# Patient Record
Sex: FEMALE | Race: WHITE | NOT HISPANIC OR LATINO | URBAN - METROPOLITAN AREA
[De-identification: names, ages, dates, MRNs, and addresses within clinical notes are randomized per-mention and may not be internally consistent; named-entity substitution may affect disease eponyms.]

---

## 2017-01-03 ENCOUNTER — APPOINTMENT (RX ONLY)
Dept: URBAN - METROPOLITAN AREA CLINIC 349 | Facility: CLINIC | Age: 42
Setting detail: DERMATOLOGY
End: 2017-01-03

## 2017-01-03 DIAGNOSIS — Z85.828 PERSONAL HISTORY OF OTHER MALIGNANT NEOPLASM OF SKIN: ICD-10-CM

## 2017-01-03 DIAGNOSIS — Z71.89 OTHER SPECIFIED COUNSELING: ICD-10-CM

## 2017-01-03 DIAGNOSIS — L30.9 DERMATITIS, UNSPECIFIED: ICD-10-CM

## 2017-01-03 DIAGNOSIS — L82.1 OTHER SEBORRHEIC KERATOSIS: ICD-10-CM

## 2017-01-03 DIAGNOSIS — L73.8 OTHER SPECIFIED FOLLICULAR DISORDERS: ICD-10-CM

## 2017-01-03 DIAGNOSIS — L81.4 OTHER MELANIN HYPERPIGMENTATION: ICD-10-CM

## 2017-01-03 PROBLEM — L70.0 ACNE VULGARIS: Status: ACTIVE | Noted: 2017-01-03

## 2017-01-03 PROBLEM — L57.8 OTHER SKIN CHANGES DUE TO CHRONIC EXPOSURE TO NONIONIZING RADIATION: Status: ACTIVE | Noted: 2017-01-03

## 2017-01-03 PROCEDURE — ? OBSERVATION

## 2017-01-03 PROCEDURE — 99202 OFFICE O/P NEW SF 15 MIN: CPT

## 2017-01-03 PROCEDURE — ? COUNSELING

## 2017-01-03 PROCEDURE — ? OTHER

## 2017-01-03 PROCEDURE — ? RECOMMENDATIONS

## 2017-01-03 ASSESSMENT — LOCATION ZONE DERM
LOCATION ZONE: NOSE
LOCATION ZONE: LEG
LOCATION ZONE: FACE

## 2017-01-03 ASSESSMENT — LOCATION DETAILED DESCRIPTION DERM
LOCATION DETAILED: LEFT CENTRAL ZYGOMA
LOCATION DETAILED: LEFT NASAL ALA
LOCATION DETAILED: LEFT CENTRAL MALAR CHEEK
LOCATION DETAILED: LEFT INFERIOR CENTRAL MALAR CHEEK
LOCATION DETAILED: LEFT ANTERIOR PROXIMAL THIGH

## 2017-01-03 ASSESSMENT — LOCATION SIMPLE DESCRIPTION DERM
LOCATION SIMPLE: LEFT NOSE
LOCATION SIMPLE: LEFT ZYGOMA
LOCATION SIMPLE: LEFT CHEEK
LOCATION SIMPLE: LEFT THIGH

## 2017-01-03 NOTE — PROCEDURE: RECOMMENDATIONS
Recommendations (Free Text): Bleaching creams can be used as well as RetinA\\nDaily sunscreen should be worn to prevent further sun damage
Detail Level: Detailed
Recommendations (Free Text): Samples of ultravate given to use on area to help fade
Recommendations (Free Text): Panoxyl wash otc 4% for oily face

## 2017-01-03 NOTE — HPI: NON-MELANOMA SKIN CANCER F/U (HISTORY OF NMSC)
How Many Skin Cancers Have You Had?: one
What Is The Reason For Today's Visit?: History of Non-Melanoma Skin Cancer
When Was Your Last Cancer Diagnosed?: 2014

## 2017-01-03 NOTE — PROCEDURE: OTHER
Other (Free Text): History provided per patient
Detail Level: Detailed
Note Text (......Xxx Chief Complaint.): This diagnosis correlates with the

## 2017-02-27 ENCOUNTER — HOSPITAL ENCOUNTER (OUTPATIENT)
Dept: SURGERY | Age: 42
Discharge: HOME | End: 2017-02-27

## 2017-02-28 VITALS — BODY MASS INDEX: 24.16 KG/M2 | WEIGHT: 145 LBS | HEIGHT: 65 IN

## 2017-02-28 NOTE — PERIOP NOTES
Patient verified name, , and surgery as listed in [facility]. Type 2 surgery    Labs per surgeon: UNKNOWN; orders NOT received. Labs per anesthesia protocol: HGB  EKG: NOT needed per protocols. Patient informed of GYN class on 3/3/17 @ 1100 at which time labs will be drawn. Patient will also receive all patient education and hibiclens soap to use per hospital policy. Patient instructed to hold all vitamins 7 days prior to surgery and NSAIDS 5 days prior to surgery, patient verbalized understanding. Patient instructed to continue previous medications as prescribed prior to surgery and to take the following medications the day of surgery according to anesthesia guidelines with a small sip of water: - none. Patient answered medical/surgical history questions at their best of ability. All prior to admission medications documented in [facility].

## 2017-03-03 ENCOUNTER — HOSPITAL ENCOUNTER (OUTPATIENT)
Dept: SURGERY | Age: 42
Discharge: HOME | End: 2017-03-03
Payer: COMMERCIAL

## 2017-03-03 LAB — HGB BLD-MCNC: 11.4 G/DL (ref 11.7–15.4)

## 2017-03-03 PROCEDURE — 36415 COLL VENOUS BLD VENIPUNCTURE: CPT | Performed by: ANESTHESIOLOGY

## 2017-03-03 PROCEDURE — 85018 HEMOGLOBIN: CPT | Performed by: ANESTHESIOLOGY

## 2017-03-03 NOTE — PERIOP NOTES
Lab results within limits per anesthesia protocol; OK for surgery.      Recent Results (from the past 12 hour(s))   HEMOGLOBIN    Collection Time: 03/03/17 11:10 AM   Result Value Ref Range    HGB 11.4 (L) 11.7 - 15.4 g/dL

## 2017-03-12 ENCOUNTER — ANESTHESIA EVENT (OUTPATIENT)
Dept: SURGERY | Age: 42
End: 2017-03-12
Payer: COMMERCIAL

## 2017-03-13 ENCOUNTER — ANESTHESIA (OUTPATIENT)
Dept: SURGERY | Age: 42
End: 2017-03-13
Payer: COMMERCIAL

## 2017-03-13 ENCOUNTER — HOSPITAL ENCOUNTER (OUTPATIENT)
Age: 42
Setting detail: OUTPATIENT SURGERY
Discharge: HOME | End: 2017-03-13
Attending: OBSTETRICS & GYNECOLOGY | Admitting: OBSTETRICS & GYNECOLOGY
Payer: COMMERCIAL

## 2017-03-13 VITALS
OXYGEN SATURATION: 99 % | DIASTOLIC BLOOD PRESSURE: 57 MMHG | RESPIRATION RATE: 16 BRPM | SYSTOLIC BLOOD PRESSURE: 97 MMHG | HEIGHT: 65 IN | BODY MASS INDEX: 25.52 KG/M2 | WEIGHT: 153.19 LBS | TEMPERATURE: 100.3 F | HEART RATE: 57 BPM

## 2017-03-13 LAB
ABO + RH BLD: NORMAL
BLOOD GROUP ANTIBODIES SERPL: NORMAL
ERYTHROCYTE [DISTWIDTH] IN BLOOD BY AUTOMATED COUNT: 13.7 % (ref 11.9–14.6)
HCG UR QL: NEGATIVE
HCT VFR BLD AUTO: 36.9 % (ref 35.8–46.3)
HGB BLD-MCNC: 11.7 G/DL (ref 11.7–15.4)
MCH RBC QN AUTO: 29.2 PG (ref 26.1–32.9)
MCHC RBC AUTO-ENTMCNC: 31.7 G/DL (ref 31.4–35)
MCV RBC AUTO: 92 FL (ref 79.6–97.8)
PLATELET # BLD AUTO: 334 K/UL (ref 150–450)
PMV BLD AUTO: 10.5 FL (ref 10.8–14.1)
RBC # BLD AUTO: 4.01 M/UL (ref 4.05–5.25)
SPECIMEN EXP DATE BLD: NORMAL
WBC # BLD AUTO: 4 K/UL (ref 4.3–11.1)

## 2017-03-13 PROCEDURE — 77030008477 HC STYL SATN SLP COVD -A: Performed by: ANESTHESIOLOGY

## 2017-03-13 PROCEDURE — 74011250636 HC RX REV CODE- 250/636: Performed by: ANESTHESIOLOGY

## 2017-03-13 PROCEDURE — C1782 MORCELLATOR: HCPCS | Performed by: OBSTETRICS & GYNECOLOGY

## 2017-03-13 PROCEDURE — 74011250636 HC RX REV CODE- 250/636: Performed by: OBSTETRICS & GYNECOLOGY

## 2017-03-13 PROCEDURE — 77030031139 HC SUT VCRL2 J&J -A: Performed by: OBSTETRICS & GYNECOLOGY

## 2017-03-13 PROCEDURE — 77030035277 HC OBTRTR BLDELSS DISP INTU -B: Performed by: OBSTETRICS & GYNECOLOGY

## 2017-03-13 PROCEDURE — 77030014064 HC TIP MANIP UTER COOP -C: Performed by: OBSTETRICS & GYNECOLOGY

## 2017-03-13 PROCEDURE — 77030008606 HC TRCR ENDOSC KII AMR -B: Performed by: OBSTETRICS & GYNECOLOGY

## 2017-03-13 PROCEDURE — 85027 COMPLETE CBC AUTOMATED: CPT | Performed by: OBSTETRICS & GYNECOLOGY

## 2017-03-13 PROCEDURE — 77030008771 HC TU NG SALEM SUMP -A: Performed by: ANESTHESIOLOGY

## 2017-03-13 PROCEDURE — 77030022704 HC SUT VLOC COVD -B: Performed by: OBSTETRICS & GYNECOLOGY

## 2017-03-13 PROCEDURE — 77030008703 HC TU ET UNCUF COVD -A: Performed by: ANESTHESIOLOGY

## 2017-03-13 PROCEDURE — 77030016151 HC PROTCTR LNS DFOG COVD -B: Performed by: OBSTETRICS & GYNECOLOGY

## 2017-03-13 PROCEDURE — 77030005520 HC CATH URETH FOL38 BARD -A: Performed by: OBSTETRICS & GYNECOLOGY

## 2017-03-13 PROCEDURE — 74011250636 HC RX REV CODE- 250/636

## 2017-03-13 PROCEDURE — 76010000878 HC OR TIME 3 TO 3.5HR INTENSV - TIER 2: Performed by: OBSTETRICS & GYNECOLOGY

## 2017-03-13 PROCEDURE — 76210000016 HC OR PH I REC 1 TO 1.5 HR: Performed by: OBSTETRICS & GYNECOLOGY

## 2017-03-13 PROCEDURE — 77030007956 HC PCH ENDOSC SPEC COVD -C: Performed by: OBSTETRICS & GYNECOLOGY

## 2017-03-13 PROCEDURE — 77030010507 HC ADH SKN DERMBND J&J -B: Performed by: OBSTETRICS & GYNECOLOGY

## 2017-03-13 PROCEDURE — 74011000250 HC RX REV CODE- 250: Performed by: ANESTHESIOLOGY

## 2017-03-13 PROCEDURE — 77030032490 HC SLV COMPR SCD KNE COVD -B: Performed by: OBSTETRICS & GYNECOLOGY

## 2017-03-13 PROCEDURE — 77030020782 HC GWN BAIR PAWS FLX 3M -B: Performed by: ANESTHESIOLOGY

## 2017-03-13 PROCEDURE — 74011250637 HC RX REV CODE- 250/637: Performed by: ANESTHESIOLOGY

## 2017-03-13 PROCEDURE — 77030002933 HC SUT MCRYL J&J -A: Performed by: OBSTETRICS & GYNECOLOGY

## 2017-03-13 PROCEDURE — 74011250637 HC RX REV CODE- 250/637

## 2017-03-13 PROCEDURE — 88307 TISSUE EXAM BY PATHOLOGIST: CPT | Performed by: OBSTETRICS & GYNECOLOGY

## 2017-03-13 PROCEDURE — 77030000038 HC TIP SCIS LAPSCP SURI -B: Performed by: OBSTETRICS & GYNECOLOGY

## 2017-03-13 PROCEDURE — 77030011640 HC PAD GRND REM COVD -A: Performed by: OBSTETRICS & GYNECOLOGY

## 2017-03-13 PROCEDURE — 86900 BLOOD TYPING SEROLOGIC ABO: CPT | Performed by: OBSTETRICS & GYNECOLOGY

## 2017-03-13 PROCEDURE — 74011000250 HC RX REV CODE- 250

## 2017-03-13 PROCEDURE — 81025 URINE PREGNANCY TEST: CPT

## 2017-03-13 PROCEDURE — 76210000024 HC REC RM PH II 2.5 TO 3 HR: Performed by: OBSTETRICS & GYNECOLOGY

## 2017-03-13 PROCEDURE — 77030010545: Performed by: OBSTETRICS & GYNECOLOGY

## 2017-03-13 PROCEDURE — 74011000250 HC RX REV CODE- 250: Performed by: OBSTETRICS & GYNECOLOGY

## 2017-03-13 PROCEDURE — 77030018846 HC SOL IRR STRL H20 ICUM -A: Performed by: OBSTETRICS & GYNECOLOGY

## 2017-03-13 PROCEDURE — 77030018836 HC SOL IRR NACL ICUM -A: Performed by: OBSTETRICS & GYNECOLOGY

## 2017-03-13 PROCEDURE — 76060000037 HC ANESTHESIA 3 TO 3.5 HR: Performed by: OBSTETRICS & GYNECOLOGY

## 2017-03-13 PROCEDURE — 77030008518 HC TBNG INSUF ENDO STRY -B: Performed by: OBSTETRICS & GYNECOLOGY

## 2017-03-13 PROCEDURE — 77030035044 HC TRCR ENDOSC VRSPRT BLDLSS COVD -C: Performed by: OBSTETRICS & GYNECOLOGY

## 2017-03-13 PROCEDURE — 77030035029 HC NDL INSUF VERES DISP COVD -B: Performed by: OBSTETRICS & GYNECOLOGY

## 2017-03-13 PROCEDURE — 77030008756 HC TU IRR SUC STRY -B: Performed by: OBSTETRICS & GYNECOLOGY

## 2017-03-13 RX ORDER — NALOXONE HYDROCHLORIDE 0.4 MG/ML
0.1 INJECTION, SOLUTION INTRAMUSCULAR; INTRAVENOUS; SUBCUTANEOUS
Status: DISCONTINUED | OUTPATIENT
Start: 2017-03-13 | End: 2017-03-13 | Stop reason: HOSPADM

## 2017-03-13 RX ORDER — PROPOFOL 10 MG/ML
INJECTION, EMULSION INTRAVENOUS AS NEEDED
Status: DISCONTINUED | OUTPATIENT
Start: 2017-03-13 | End: 2017-03-13 | Stop reason: HOSPADM

## 2017-03-13 RX ORDER — NALBUPHINE HYDROCHLORIDE 10 MG/ML
5 INJECTION, SOLUTION INTRAMUSCULAR; INTRAVENOUS; SUBCUTANEOUS
Status: DISCONTINUED | OUTPATIENT
Start: 2017-03-13 | End: 2017-03-13 | Stop reason: HOSPADM

## 2017-03-13 RX ORDER — SODIUM CHLORIDE 0.9 % (FLUSH) 0.9 %
5-10 SYRINGE (ML) INJECTION EVERY 8 HOURS
Status: DISCONTINUED | OUTPATIENT
Start: 2017-03-13 | End: 2017-03-13 | Stop reason: HOSPADM

## 2017-03-13 RX ORDER — HYDROMORPHONE HYDROCHLORIDE 2 MG/ML
0.5 INJECTION, SOLUTION INTRAMUSCULAR; INTRAVENOUS; SUBCUTANEOUS
Status: DISCONTINUED | OUTPATIENT
Start: 2017-03-13 | End: 2017-03-13 | Stop reason: HOSPADM

## 2017-03-13 RX ORDER — FENTANYL CITRATE 50 UG/ML
INJECTION, SOLUTION INTRAMUSCULAR; INTRAVENOUS AS NEEDED
Status: DISCONTINUED | OUTPATIENT
Start: 2017-03-13 | End: 2017-03-13 | Stop reason: HOSPADM

## 2017-03-13 RX ORDER — SODIUM CHLORIDE 0.9 % (FLUSH) 0.9 %
5-10 SYRINGE (ML) INJECTION AS NEEDED
Status: DISCONTINUED | OUTPATIENT
Start: 2017-03-13 | End: 2017-03-13 | Stop reason: HOSPADM

## 2017-03-13 RX ORDER — KETOROLAC TROMETHAMINE 30 MG/ML
INJECTION, SOLUTION INTRAMUSCULAR; INTRAVENOUS AS NEEDED
Status: DISCONTINUED | OUTPATIENT
Start: 2017-03-13 | End: 2017-03-13 | Stop reason: HOSPADM

## 2017-03-13 RX ORDER — DEXAMETHASONE SODIUM PHOSPHATE 4 MG/ML
INJECTION, SOLUTION INTRA-ARTICULAR; INTRALESIONAL; INTRAMUSCULAR; INTRAVENOUS; SOFT TISSUE AS NEEDED
Status: DISCONTINUED | OUTPATIENT
Start: 2017-03-13 | End: 2017-03-13 | Stop reason: HOSPADM

## 2017-03-13 RX ORDER — GLYCOPYRROLATE 0.2 MG/ML
INJECTION INTRAMUSCULAR; INTRAVENOUS AS NEEDED
Status: DISCONTINUED | OUTPATIENT
Start: 2017-03-13 | End: 2017-03-13 | Stop reason: HOSPADM

## 2017-03-13 RX ORDER — SODIUM CHLORIDE, SODIUM LACTATE, POTASSIUM CHLORIDE, CALCIUM CHLORIDE 600; 310; 30; 20 MG/100ML; MG/100ML; MG/100ML; MG/100ML
100 INJECTION, SOLUTION INTRAVENOUS CONTINUOUS
Status: DISCONTINUED | OUTPATIENT
Start: 2017-03-13 | End: 2017-03-13 | Stop reason: HOSPADM

## 2017-03-13 RX ORDER — LIDOCAINE HYDROCHLORIDE 20 MG/ML
INJECTION, SOLUTION EPIDURAL; INFILTRATION; INTRACAUDAL; PERINEURAL AS NEEDED
Status: DISCONTINUED | OUTPATIENT
Start: 2017-03-13 | End: 2017-03-13 | Stop reason: HOSPADM

## 2017-03-13 RX ORDER — NEOSTIGMINE METHYLSULFATE 1 MG/ML
INJECTION INTRAVENOUS AS NEEDED
Status: DISCONTINUED | OUTPATIENT
Start: 2017-03-13 | End: 2017-03-13 | Stop reason: HOSPADM

## 2017-03-13 RX ORDER — FENTANYL CITRATE 50 UG/ML
100 INJECTION, SOLUTION INTRAMUSCULAR; INTRAVENOUS ONCE
Status: DISCONTINUED | OUTPATIENT
Start: 2017-03-13 | End: 2017-03-13 | Stop reason: HOSPADM

## 2017-03-13 RX ORDER — BUPIVACAINE HYDROCHLORIDE AND EPINEPHRINE 2.5; 5 MG/ML; UG/ML
INJECTION, SOLUTION EPIDURAL; INFILTRATION; INTRACAUDAL; PERINEURAL AS NEEDED
Status: DISCONTINUED | OUTPATIENT
Start: 2017-03-13 | End: 2017-03-13 | Stop reason: HOSPADM

## 2017-03-13 RX ORDER — MIDAZOLAM HYDROCHLORIDE 1 MG/ML
2 INJECTION, SOLUTION INTRAMUSCULAR; INTRAVENOUS
Status: DISCONTINUED | OUTPATIENT
Start: 2017-03-13 | End: 2017-03-13 | Stop reason: HOSPADM

## 2017-03-13 RX ORDER — OXYCODONE HYDROCHLORIDE 5 MG/1
5 TABLET ORAL
Status: COMPLETED | OUTPATIENT
Start: 2017-03-13 | End: 2017-03-13

## 2017-03-13 RX ORDER — ONDANSETRON 2 MG/ML
4 INJECTION INTRAMUSCULAR; INTRAVENOUS
Status: DISCONTINUED | OUTPATIENT
Start: 2017-03-13 | End: 2017-03-13 | Stop reason: HOSPADM

## 2017-03-13 RX ORDER — ONDANSETRON 2 MG/ML
INJECTION INTRAMUSCULAR; INTRAVENOUS AS NEEDED
Status: DISCONTINUED | OUTPATIENT
Start: 2017-03-13 | End: 2017-03-13 | Stop reason: HOSPADM

## 2017-03-13 RX ORDER — IBUPROFEN 800 MG/1
800 TABLET ORAL
Qty: 60 TAB | Refills: 0 | Status: SHIPPED | OUTPATIENT
Start: 2017-03-13 | End: 2017-03-23

## 2017-03-13 RX ORDER — SCOLOPAMINE TRANSDERMAL SYSTEM 1 MG/1
1.5 PATCH, EXTENDED RELEASE TRANSDERMAL
COMMUNITY
End: 2017-03-13

## 2017-03-13 RX ORDER — MIDAZOLAM HYDROCHLORIDE 1 MG/ML
2 INJECTION, SOLUTION INTRAMUSCULAR; INTRAVENOUS ONCE
Status: COMPLETED | OUTPATIENT
Start: 2017-03-13 | End: 2017-03-13

## 2017-03-13 RX ORDER — FLUMAZENIL 0.1 MG/ML
0.2 INJECTION INTRAVENOUS
Status: DISCONTINUED | OUTPATIENT
Start: 2017-03-13 | End: 2017-03-13 | Stop reason: HOSPADM

## 2017-03-13 RX ORDER — CEFAZOLIN SODIUM IN 0.9 % NACL 2 G/50 ML
2 INTRAVENOUS SOLUTION, PIGGYBACK (ML) INTRAVENOUS ONCE
Status: COMPLETED | OUTPATIENT
Start: 2017-03-13 | End: 2017-03-13

## 2017-03-13 RX ORDER — SODIUM CHLORIDE 0.9 % (FLUSH) 0.9 %
5-10 SYRINGE (ML) INJECTION EVERY 8 HOURS
Status: CANCELLED | OUTPATIENT
Start: 2017-03-13

## 2017-03-13 RX ORDER — ROCURONIUM BROMIDE 10 MG/ML
INJECTION, SOLUTION INTRAVENOUS AS NEEDED
Status: DISCONTINUED | OUTPATIENT
Start: 2017-03-13 | End: 2017-03-13 | Stop reason: HOSPADM

## 2017-03-13 RX ORDER — LIDOCAINE HYDROCHLORIDE 10 MG/ML
0.1 INJECTION INFILTRATION; PERINEURAL AS NEEDED
Status: DISCONTINUED | OUTPATIENT
Start: 2017-03-13 | End: 2017-03-13 | Stop reason: HOSPADM

## 2017-03-13 RX ORDER — OXYCODONE AND ACETAMINOPHEN 5; 325 MG/1; MG/1
2 TABLET ORAL
Qty: 40 TAB | Refills: 0 | Status: SHIPPED | OUTPATIENT
Start: 2017-03-13 | End: 2017-12-29

## 2017-03-13 RX ORDER — SODIUM CHLORIDE 0.9 % (FLUSH) 0.9 %
5-10 SYRINGE (ML) INJECTION AS NEEDED
Status: CANCELLED | OUTPATIENT
Start: 2017-03-13

## 2017-03-13 RX ADMIN — LIDOCAINE HYDROCHLORIDE 0.1 ML: 10 INJECTION, SOLUTION INFILTRATION; PERINEURAL at 07:25

## 2017-03-13 RX ADMIN — OXYCODONE HYDROCHLORIDE 5 MG: 5 TABLET ORAL at 13:48

## 2017-03-13 RX ADMIN — HYDROMORPHONE HYDROCHLORIDE 0.5 MG: 2 INJECTION, SOLUTION INTRAMUSCULAR; INTRAVENOUS; SUBCUTANEOUS at 11:37

## 2017-03-13 RX ADMIN — SODIUM CHLORIDE, SODIUM LACTATE, POTASSIUM CHLORIDE, AND CALCIUM CHLORIDE 100 ML/HR: 600; 310; 30; 20 INJECTION, SOLUTION INTRAVENOUS at 07:24

## 2017-03-13 RX ADMIN — ROCURONIUM BROMIDE 10 MG: 10 INJECTION, SOLUTION INTRAVENOUS at 09:02

## 2017-03-13 RX ADMIN — FENTANYL CITRATE 25 MCG: 50 INJECTION, SOLUTION INTRAMUSCULAR; INTRAVENOUS at 10:24

## 2017-03-13 RX ADMIN — KETOROLAC TROMETHAMINE 30 MG: 30 INJECTION, SOLUTION INTRAMUSCULAR; INTRAVENOUS at 10:34

## 2017-03-13 RX ADMIN — ROCURONIUM BROMIDE 50 MG: 10 INJECTION, SOLUTION INTRAVENOUS at 08:01

## 2017-03-13 RX ADMIN — LIDOCAINE HYDROCHLORIDE 60 MG: 20 INJECTION, SOLUTION EPIDURAL; INFILTRATION; INTRACAUDAL; PERINEURAL at 08:01

## 2017-03-13 RX ADMIN — ONDANSETRON 4 MG: 2 INJECTION INTRAMUSCULAR; INTRAVENOUS at 09:25

## 2017-03-13 RX ADMIN — PROPOFOL 200 MG: 10 INJECTION, EMULSION INTRAVENOUS at 08:01

## 2017-03-13 RX ADMIN — HYDROMORPHONE HYDROCHLORIDE 0.5 MG: 2 INJECTION, SOLUTION INTRAMUSCULAR; INTRAVENOUS; SUBCUTANEOUS at 11:07

## 2017-03-13 RX ADMIN — FENTANYL CITRATE 100 MCG: 50 INJECTION, SOLUTION INTRAMUSCULAR; INTRAVENOUS at 08:01

## 2017-03-13 RX ADMIN — DEXAMETHASONE SODIUM PHOSPHATE 10 MG: 4 INJECTION, SOLUTION INTRA-ARTICULAR; INTRALESIONAL; INTRAMUSCULAR; INTRAVENOUS; SOFT TISSUE at 08:10

## 2017-03-13 RX ADMIN — SODIUM CHLORIDE, SODIUM LACTATE, POTASSIUM CHLORIDE, AND CALCIUM CHLORIDE: 600; 310; 30; 20 INJECTION, SOLUTION INTRAVENOUS at 10:30

## 2017-03-13 RX ADMIN — NEOSTIGMINE METHYLSULFATE 3 MG: 1 INJECTION INTRAVENOUS at 10:37

## 2017-03-13 RX ADMIN — GLYCOPYRROLATE 0.4 MG: 0.2 INJECTION INTRAMUSCULAR; INTRAVENOUS at 10:37

## 2017-03-13 RX ADMIN — CEFAZOLIN 2 G: 1 INJECTION, POWDER, FOR SOLUTION INTRAMUSCULAR; INTRAVENOUS; PARENTERAL at 08:05

## 2017-03-13 RX ADMIN — MIDAZOLAM HYDROCHLORIDE 2 MG: 1 INJECTION, SOLUTION INTRAMUSCULAR; INTRAVENOUS at 07:27

## 2017-03-13 RX ADMIN — SODIUM CHLORIDE, SODIUM LACTATE, POTASSIUM CHLORIDE, AND CALCIUM CHLORIDE: 600; 310; 30; 20 INJECTION, SOLUTION INTRAVENOUS at 08:12

## 2017-03-13 RX ADMIN — FENTANYL CITRATE 100 MCG: 50 INJECTION, SOLUTION INTRAMUSCULAR; INTRAVENOUS at 08:23

## 2017-03-13 NOTE — ANESTHESIA POSTPROCEDURE EVALUATION
Post-Anesthesia Evaluation and Assessment    Patient: Tobias Garcia MRN: [id number]  SSN: [id number]    YOB: 1975  Age: 38 y.o. Sex: female       Cardiovascular Function/Vital Signs  Visit Vitals    BP 97/57    Pulse (!) 57    Temp 37.9 °C (100.3 °F)    Resp 16    Ht 5' 5\" (1.651 m)    Wt 69.5 kg (153 lb 3 oz)    SpO2 99%    BMI 25.49 kg/m2       Patient is status post general anesthesia for Procedure(s): HYSTERECTOMY SUPRACERVICAL ROBOTIC ASSISTED WITH BILATERAL SALPINGECTOMY. Nausea/Vomiting: None    Postoperative hydration reviewed and adequate. Pain:  Pain Scale 1: Numeric (0 - 10) (03/13/17 1145)  Pain Intensity 1: 5 (03/13/17 1145)   Managed    Neurological Status:   Neuro (WDL): Exceptions to WDL (03/13/17 1145)  Neuro  Neurologic State: Drowsy; Eyes open spontaneously (03/13/17 1145)  Orientation Level: Oriented to person;Oriented to place;Oriented to situation (03/13/17 1145)  Speech: Clear (03/13/17 1145)  LUE Motor Response: Purposeful (03/13/17 1145)  LLE Motor Response: Purposeful (03/13/17 1145)  RUE Motor Response: Purposeful (03/13/17 1145)  RLE Motor Response: Purposeful (03/13/17 1145)   At baseline    Mental Status and Level of Consciousness: Arousable    Pulmonary Status:   O2 Device: Room air (03/13/17 1200)   Adequate oxygenation and airway patent    Complications related to anesthesia: None    Post-anesthesia assessment completed.  No concerns    Signed By: Manuel Quiroga MD     March 13, 2017

## 2017-03-13 NOTE — BRIEF OP NOTE
BRIEF OPERATIVE NOTE    Date of Procedure: 3/13/2017   Preoperative Diagnosis: Dysfunctional uterine hemorrhage [N93.8]  Leiomyoma, intramural [D25.1]  Postoperative Diagnosis: Dysfunctional uterine hemorrhage [N93.8]  Leiomyoma, intramural [D25.1]    Procedure(s): HYSTERECTOMY SUPRACERVICAL ROBOTIC ASSISTED WITH BILATERAL SALPINGECTOMY  Surgeon(s) and Role:     * Patricia Keita MD - Primary            Surgical Staff:  Circ-1: Compa Bragg RN  Circ-2: Carlos Giraldo RN  Scrub Tech-2: Major Martina; Birda Lady  Event Time In   Incision Start 0818   Incision Close 1043     Anesthesia: General   Estimated Blood Loss: 200  Specimens:   ID Type Source Tests Collected by Time Destination   1 : uterus and bilateral fallopian tubes Fresh Uterus with Bilateral Fallopian Tubes  Patricia Keita MD 3/13/2017 1016 Pathology      Findings: 18wk Size uterus, with large 13cm posterior fibroid. Cavity depth sounded to 15cm. Normal ovaries, normal tubes, appendix, upper abdomen.    Pelvis otherwise normal.  Complications: none  Implants: * No implants in log *

## 2017-03-13 NOTE — IP AVS SNAPSHOT
Chiqui Patel 
 
 
 [id number] [facility] [place]  
[phone] Patient: Clydia Angelucci MRN: [id number] BVR:[birth year] You are allergic to the following No active allergies Recent Documentation Height Weight BMI OB Status Smoking Status 1.651 m 69.5 kg 25.49 kg/m2 Having regular periods Never Smoker Emergency Contacts Name Discharge Info Relation Home Work Mobile Wong Malcolm  Spouse [phone] About your hospitalization You were admitted on:  March 13, 2017 You last received care in the:  [facility] PACU You were discharged on:  March 13, 2017 Unit phone number:  [phone] Why you were hospitalized Your primary diagnosis was:  Not on File Providers Seen During Your Hospitalizations Provider Role Specialty Primary office phone Patricia Keita MD Attending Provider Obstetrics & Gynecology [phone] Your Primary Care Physician (PCP) Primary Care Physician Office Phone Office Fax [address] [address] [phone] Follow-up Information Follow up With Details Comments Contact Info Makayla Sutton MD   [address] [place] [address] Adult and Family Medicine [facility] [address] 
[phone] Patricia Keita MD Schedule an appointment as soon as possible for a visit in 1 month(s)  [address] [place] [address] [facility] [address] 
[phone] Current Discharge Medication List  
  
START taking these medications Dose & Instructions Dispensing Information Comments Morning Noon Evening Bedtime  
 ibuprofen 800 mg tablet Commonly known as:  MOTRIN Your last dose was: Your next dose is: Other:  _________ Dose:  800 mg Take 1 Tab by mouth every eight (8) hours as needed for Pain for up to 10 days. Quantity:  60 Tab Refills:  0  
     
   
   
   
  
 oxyCODONE-acetaminophen 5-325 mg per tablet Commonly known as:  PERCOCET Your last dose was: Your next dose is: Other:  _________ Dose:  2 Tab Take 2 Tabs by mouth every four (4) hours as needed for Pain. Max Daily Amount: 12 Tabs. Quantity:  40 Tab Refills:  0 STOP taking these medications   
 scopolamine 1.5 mg (1 mg over 3 days) Pt3d Commonly known as:  TRANSDERM-SCOP Where to Get Your Medications Information on where to get these meds will be given to you by the nurse or doctor. ! Ask your nurse or doctor about these medications  
  ibuprofen 800 mg tablet  
 oxyCODONE-acetaminophen 5-325 mg per tablet Discharge Instructions Vaginal Hysterectomy: What to Expect at [facility] Your Recovery You can expect to feel better and stronger each day, although you may need pain medicine for a week or two. You may get tired easily or have less energy than usual. This may last for several weeks after surgery. You will probably notice that your belly is swollen and puffy. This is common. The swelling will take several weeks to go down. It may take about 4 to 6 weeks to fully recover. The recovery time may be less for some patients. You may have some light vaginal bleeding. Don't have sex until the doctor says it is okay. Don't douche or put anything into your vagina, such as a tampon, until your doctor says it is okay. It is important to avoid lifting while you are recovering so that you can heal. 
This care sheet gives you a general idea about how long it will take for you to recover. But each person recovers at a different pace. Follow the steps below to get better as quickly as possible. How can you care for yourself at home? Activity · Rest when you feel tired. Getting enough sleep will help you recover. · Try to walk each day. Start by walking a little more than you did the day before. Bit by bit, increase the amount you walk. Walking boosts blood flow and helps prevent pneumonia and constipation. · Avoid lifting anything that would make you strain. This may include heavy grocery bags and milk containers, a heavy briefcase or backpack, cat litter or dog food bags, a vacuum , or a child. · Avoid strenuous activities, such as biking, jogging, weight lifting, or aerobic exercise, until your doctor says it is okay. · You may shower. Pat the cut (incision) dry. Do not take a bath for the first 2 weeks, or until your doctor tells you it is okay. · Ask your doctor when you can drive again. · You will probably need to take about 2 weeks off from work. It depends on the type of work you do and how you feel. · Your doctor will tell you when you can have sex again. Diet · You can eat your normal diet. If your stomach is upset, try bland, low-fat foods like plain rice, broiled chicken, toast, and yogurt. · Drink plenty of fluids (unless your doctor tells you not to). · You may notice that your bowel movements are not regular right after your surgery. This is common. Try to avoid constipation and straining with bowel movements. You may want to take a fiber supplement every day. If you have not had a bowel movement after a couple of days, ask your doctor about taking a mild laxative. Medicines · Your doctor will tell you if and when you can restart your medicines. He or she will also give you instructions about taking any new medicines. · If you take blood thinners, such as warfarin (Coumadin), clopidogrel (Plavix), or aspirin, be sure to talk to your doctor. He or she will tell you if and when to start taking those medicines again. Make sure that you understand exactly what your doctor wants you to do. · Be safe with medicines. Take pain medicines exactly as directed. ¨ If the doctor gave you a prescription medicine for pain, take it as prescribed. ¨ If you are not taking a prescription pain medicine, ask your doctor if you can take an over-the-counter medicine. · If you think your pain medicine is making you sick to your stomach: 
¨ Take your medicine after meals (unless your doctor has told you not to). ¨ Ask your doctor for a different pain medicine. · If your doctor prescribed antibiotics, take them as directed. Do not stop taking them just because you feel better. You need to take the full course of antibiotics. Incision care · You may have stitches over the cuts (incisions) the doctor made in your belly. If you have strips of tape on the incisions the doctor made, leave the tape on for a week or until it falls off. Or follow your doctor's instructions for removing the tape. · Wash the area daily with warm, soapy water, and pat it dry. Don't use hydrogen peroxide or alcohol, which can slow healing. You may cover the area with a gauze bandage if it weeps or rubs against clothing. Change the bandage every day. · Keep the area clean and dry. Other instructions · You may have some light vaginal bleeding. Wear sanitary pads if needed. Do not douche or use tampons. Follow-up care is a key part of your treatment and safety. Be sure to make and go to all appointments, and call your doctor if you are having problems. It's also a good idea to know your test results and keep a list of the medicines you take. When should you call for help? Call 911 anytime you think you may need emergency care. For example, call if: 
· You passed out (lost consciousness). · You have severe trouble breathing. · You have sudden chest pain and shortness of breath, or you cough up blood. Call your doctor now or seek immediate medical care if: 
· You have bright red vaginal bleeding that soaks one or more pads in an hour, or you have large clots. · You have foul-smelling discharge from your vagina. · You are sick to your stomach or cannot keep fluids down. · You have pain that does not get better after you take pain medicine. · You have loose stitches, or your incision comes open. · You have signs of infection, such as: 
¨ Increased pain, swelling, warmth, or redness. ¨ Red streaks leading from the incision. ¨ Pus draining from the incision. ¨ A fever. · You have signs of a blood clot, such as: 
¨ Pain in your calf, back of the knee, thigh, or groin. ¨ Redness and swelling in your leg or groin. · You have trouble passing urine or stool, especially if you have pain or swelling in your lower belly. Watch closely for changes in your health, and be sure to contact your doctor if: 
· You do not have a bowel movement after taking a laxative. · You have hot flashes, sweating, flushing, or a fast heartbeat, but no fever. Where can you learn more? Go to http://bran-araceli.info/. Enter L197 in the search box to learn more about \"Laparoscopically Assisted Vaginal Hysterectomy: What to Expect at Home. \" Current as of: February 25, 2016 Content Version: 11.1 © 0918-3716 [employer]. Care instructions adapted under license by [employer] (which disclaims liability or warranty for this information). If you have questions about a medical condition or this instruction, always ask your healthcare professional. [place] [address] any warranty or liability for your use of this information. Discharge Orders None Introducing [facility]! Dear Rolanda Cohn: Thank you for requesting a Equiom account. Our records indicate that you already have an active Equiom account. You can access your account anytime at [email or web address] Did you know that you can access your hospital and ER discharge instructions at any time in Equiom?   You can also review all of your test results from your hospital stay or ER visit. Additional Information If you have questions, please visit the Frequently Asked Questions section of the [facility] website at [email or web address] Remember, MyChart is NOT to be used for urgent needs. For medical emergencies, dial 911. Now available from your iPhone and Android! General Information Please provide this summary of care documentation to your next provider. Patient Signature:  ____________________________________________________________ Date:  ____________________________________________________________  
  
Harper Ewingm Provider Signature:  ____________________________________________________________ Date:  ____________________________________________________________

## 2017-03-13 NOTE — ANESTHESIA PREPROCEDURE EVALUATION
Anesthetic History     PONV          Review of Systems / Medical History  Patient summary reviewed and pertinent labs reviewed    Pulmonary  Within defined limits                 Neuro/Psych   Within defined limits           Cardiovascular  Within defined limits                Exercise tolerance: >4 METS     GI/Hepatic/Renal  Within defined limits              Endo/Other  Within defined limits           Other Findings              Physical Exam    Airway  Mallampati: I  TM Distance: > 6 cm  Neck ROM: normal range of motion   Mouth opening: Normal     Cardiovascular  Regular rate and rhythm,  S1 and S2 normal,  no murmur, click, rub, or gallop  Rhythm: regular  Rate: normal         Dental  No notable dental hx       Pulmonary  Breath sounds clear to auscultation               Abdominal  GI exam deferred       Other Findings            Anesthetic Plan    ASA: 1  Anesthesia type: general          Induction: Intravenous  Anesthetic plan and risks discussed with: Patient and Spouse      Pt has scopolamine patch that was ordered by surgeon already applied behind right ear. Will plan for intraop anti-emetics.

## 2017-03-13 NOTE — DISCHARGE INSTRUCTIONS
Vaginal Hysterectomy: What to Expect at [address] [place] [address] can expect to feel better and stronger each day, although you may need pain medicine for a week or two. You may get tired easily or have less energy than usual. This may last for several weeks after surgery. You will probably notice that your belly is swollen and puffy. This is common. The swelling will take several weeks to go down. It may take about 4 to 6 weeks to fully recover. The recovery time may be less for some patients. You may have some light vaginal bleeding. Don't have sex until the doctor says it is okay. Don't douche or put anything into your vagina, such as a tampon, until your doctor says it is okay. It is important to avoid lifting while you are recovering so that you can heal.  This care sheet gives you a general idea about how long it will take for you to recover. But each person recovers at a different pace. Follow the steps below to get better as quickly as possible. How can you care for yourself at home? Activity  · Rest when you feel tired. Getting enough sleep will help you recover. · Try to walk each day. Start by walking a little more than you did the day before. Bit by bit, increase the amount you walk. Walking boosts blood flow and helps prevent pneumonia and constipation. · Avoid lifting anything that would make you strain. This may include heavy grocery bags and milk containers, a heavy briefcase or backpack, cat litter or dog food bags, a vacuum , or a child. · Avoid strenuous activities, such as biking, jogging, weight lifting, or aerobic exercise, until your doctor says it is okay. · You may shower. Pat the cut (incision) dry. Do not take a bath for the first 2 weeks, or until your doctor tells you it is okay. · Ask your doctor when you can drive again. · You will probably need to take about 2 weeks off from work. It depends on the type of work you do and how you feel.   · Your doctor will tell you when you can have sex again. Diet  · You can eat your normal diet. If your stomach is upset, try bland, low-fat foods like plain rice, broiled chicken, toast, and yogurt. · Drink plenty of fluids (unless your doctor tells you not to). · You may notice that your bowel movements are not regular right after your surgery. This is common. Try to avoid constipation and straining with bowel movements. You may want to take a fiber supplement every day. If you have not had a bowel movement after a couple of days, ask your doctor about taking a mild laxative. Medicines  · Your doctor will tell you if and when you can restart your medicines. He or she will also give you instructions about taking any new medicines. · If you take blood thinners, such as warfarin (Coumadin), clopidogrel (Plavix), or aspirin, be sure to talk to your doctor. He or she will tell you if and when to start taking those medicines again. Make sure that you understand exactly what your doctor wants you to do. · Be safe with medicines. Take pain medicines exactly as directed. ¨ If the doctor gave you a prescription medicine for pain, take it as prescribed. ¨ If you are not taking a prescription pain medicine, ask your doctor if you can take an over-the-counter medicine. · If you think your pain medicine is making you sick to your stomach:  ¨ Take your medicine after meals (unless your doctor has told you not to). ¨ Ask your doctor for a different pain medicine. · If your doctor prescribed antibiotics, take them as directed. Do not stop taking them just because you feel better. You need to take the full course of antibiotics. Incision care  · You may have stitches over the cuts (incisions) the doctor made in your belly. If you have strips of tape on the incisions the doctor made, leave the tape on for a week or until it falls off. Or follow your doctor's instructions for removing the tape.   · Wash the area daily with warm, soapy water, and pat it dry. Don't use hydrogen peroxide or alcohol, which can slow healing. You may cover the area with a gauze bandage if it weeps or rubs against clothing. Change the bandage every day. · Keep the area clean and dry. Other instructions  · You may have some light vaginal bleeding. Wear sanitary pads if needed. Do not douche or use tampons. Follow-up care is a key part of your treatment and safety. Be sure to make and go to all appointments, and call your doctor if you are having problems. It's also a good idea to know your test results and keep a list of the medicines you take. When should you call for help? Call 911 anytime you think you may need emergency care. For example, call if:  · You passed out (lost consciousness). · You have severe trouble breathing. · You have sudden chest pain and shortness of breath, or you cough up blood. Call your doctor now or seek immediate medical care if:  · You have bright red vaginal bleeding that soaks one or more pads in an hour, or you have large clots. · You have foul-smelling discharge from your vagina. · You are sick to your stomach or cannot keep fluids down. · You have pain that does not get better after you take pain medicine. · You have loose stitches, or your incision comes open. · You have signs of infection, such as:  ¨ Increased pain, swelling, warmth, or redness. ¨ Red streaks leading from the incision. ¨ Pus draining from the incision. ¨ A fever. · You have signs of a blood clot, such as:  ¨ Pain in your calf, back of the knee, thigh, or groin. ¨ Redness and swelling in your leg or groin. · You have trouble passing urine or stool, especially if you have pain or swelling in your lower belly. Watch closely for changes in your health, and be sure to contact your doctor if:  · You do not have a bowel movement after taking a laxative. · You have hot flashes, sweating, flushing, or a fast heartbeat, but no fever. Where can you learn more?   Go to http://bran-araceli.info/. Enter G469 in the search box to learn more about \"Laparoscopically Assisted Vaginal Hysterectomy: What to Expect at Home. \"  Current as of: February 25, 2016  Content Version: 11.1  © 7672-9749 [employer]. Care instructions adapted under license by [employer] (which disclaims liability or warranty for this information). If you have questions about a medical condition or this instruction, always ask your healthcare professional. [place] [address] any warranty or liability for your use of this information.

## 2017-03-14 NOTE — OP NOTES
[address]   OPERATIVE REPORT       Name:  Juanita Ribera   MR#:  [id number]   :  1975   Account #:  [de-identified]   Date of Adm:  2017       DATE: 2017     PREOPERATIVE DIAGNOSES     1. Uterine fibroids. 2. Menorrhagia. POSTOPERATIVE DIAGNOSES     1. Uterine fibroids. 2. Menorrhagia. PROCEDURE   1. Robotic-assisted laparoscopic supracervical hysterectomy. 2. Bilateral salpingectomy. SURGEON: Mykel Flores MD.    DATE OF SURGERY: Tali Stanley, Certified First Assist.     ANESTHESIA: General endotracheal.    ESTIMATED BLOOD LOSS: 200 mL. COMPLICATIONS: None. DRAINS: None. SPECIMENS: Uterus with bilateral fallopian tubes. FINDINGS: The patient had an 18-week size uterus with a large 13   cm posterior fibroid. The cavity depth sounded to 15 cm. There   were normal ovaries, normal tubes, normal appendix, normal upper   abdomen, and pelvis was otherwise normal with no abnormalities. PROCEDURE: The patient was taken to the operating room where   general endotracheal anesthesia was administered. She was   prepped and draped in normal sterile fashion. Caruso catheter   inserted. A CloudEndure uterine manipulator with KOH colpotomizer cups   were placed in the usual fashion. Attention was then turned to   the abdomen where the umbilical area was infiltrated with 0.25%   Marcaine solution with epinephrine. A 10 mm incision was made in   the umbilicus and a Veress needle placed in the abdominal   cavity, insufflating the abdomen to 15 mmHg. A 12 mm bladeless   trocar was placed under direct visualization. Three 8 mm robotic   ports and an 8 mm assistant port were placed under direct   visualization as well. The patient was placed in Trendelenburg   position.  The procedure was began by using a bipolar cautery to   cauterize and transect the left mesosalpinx and then the left   utero-ovarian ligament, then left round ligament and dividing   this and opening the left broad ligament. The ureter was   visualized and was very posterior away from the area of   dissection. The anterior leaf of the broad ligament was opened   all the way down around the bladder and the bladder pushed away   from the lower uterine segment. The broad ligament was continued   to be skeletonized on the left, the ureter was fully traced out   to its course below the uterine arteries and then the uterine   arteries were carefully skeletonized and isolated. The uterine   artery and vein were then coagulated with bipolar cautery and   transected. Attention was then turned to the right side where a   similar procedure was performed, transecting the right   mesosalpinx, right utero-ovarian ligament and round ligament,   and opening up the right broad ligament. The anterior leaf of   the broad ligament was incised connecting this to the incision   from the other side and the right uterine artery and vein were   skeletonized out which were very large. The ureter was traced at   its course and was well lateral to the area of dissection. The   uterine artery and vein were then coagulated and transected with   the bipolar cautery and transected with the scissors. The   ascending vaginal arteries were coagulated and transected on   either side as well. Once this was performed, the Electrocautery   scissors were then used to transect the cervix fully removing   the uterus from the lower part of the cervix. Once this was   performed, the cervical bed was made hemostatic. The uterine   manipulator was retracted and the majority of the cervix had   been excised with less than 1 cm cervical canal being visualized   and this area was coagulated. The cervical bed was then closed   with a 2-0 V-Loc suture in a total of 2 layers with the second   layer reincorporating the peritoneum anterior and posterior to   cover the cervical bed stump.  The broad ligament dissections   were irrigated and were all made fully hemostatic and again it   was ensured that the ureters were well out of the dissection   area. All areas in the pelvis were then fully hemostatic. The   robotic system was then undocked. A 15 mm Endopouch was used to   capture the uterus and cervix. The umbilical skin and fascial   incision was extended enough to allow the bag to be elevated out   of the incision and then the uterus and fibroid were morcellated   by hand fully within the bag without spillage of any contents. The fibroid had a very soft and mushy consistency with areas of   apparent necrosis. Once the uterus and fibroid were fully   removed from the bag, the umbilical fascial incision was closed   with 0 Vicryl figure-of-eight sutures and the trocar reinserted. The abdomen was inflated so that the abdomen and pelvis could be   copiously irrigated again. There were no portions from the   morcellation within the abdomen and the pelvis was again fully   hemostatic. The umbilical fascial incision was then fully closed   again with 0 Vicryl suture. The abdomen deflated. All skin   incisions closed with 4-0 Monocryl and sealed with Dermabond. All sponge, lap and needle counts were correct. There were no   other complications to the procedure.         MD SOFIA Moran / Shawna Jones   D:  03/13/2017   13:39   T:  03/14/2017   08:52   Job #:  [id number]

## 2017-05-01 ENCOUNTER — APPOINTMENT (RX ONLY)
Dept: URBAN - METROPOLITAN AREA CLINIC 349 | Facility: CLINIC | Age: 42
Setting detail: DERMATOLOGY
End: 2017-05-01

## 2017-05-01 DIAGNOSIS — L82.0 INFLAMED SEBORRHEIC KERATOSIS: ICD-10-CM

## 2017-05-01 DIAGNOSIS — Z85.828 PERSONAL HISTORY OF OTHER MALIGNANT NEOPLASM OF SKIN: ICD-10-CM

## 2017-05-01 DIAGNOSIS — L30.9 DERMATITIS, UNSPECIFIED: ICD-10-CM | Status: RESOLVED

## 2017-05-01 PROCEDURE — 99213 OFFICE O/P EST LOW 20 MIN: CPT | Mod: 25

## 2017-05-01 PROCEDURE — ? COUNSELING

## 2017-05-01 PROCEDURE — 17110 DESTRUCTION B9 LES UP TO 14: CPT

## 2017-05-01 PROCEDURE — ? LIQUID NITROGEN

## 2017-05-01 ASSESSMENT — LOCATION SIMPLE DESCRIPTION DERM
LOCATION SIMPLE: LEFT TEMPLE
LOCATION SIMPLE: LEFT CHEEK
LOCATION SIMPLE: LEFT THIGH

## 2017-05-01 ASSESSMENT — LOCATION DETAILED DESCRIPTION DERM
LOCATION DETAILED: LEFT CENTRAL MALAR CHEEK
LOCATION DETAILED: LEFT ANTERIOR PROXIMAL THIGH
LOCATION DETAILED: LEFT SUPERIOR CENTRAL MALAR CHEEK
LOCATION DETAILED: LEFT MID TEMPLE
LOCATION DETAILED: LEFT INFERIOR CENTRAL MALAR CHEEK

## 2017-05-01 ASSESSMENT — LOCATION ZONE DERM
LOCATION ZONE: LEG
LOCATION ZONE: FACE

## 2017-05-01 NOTE — PROCEDURE: LIQUID NITROGEN
Include Z78.9 (Other Specified Conditions Influencing Health Status) As An Associated Diagnosis?: Yes
Medical Necessity Information: It is in your best interest to select a reason for this procedure from the list below. All of these items fulfill various CMS LCD requirements except the new and changing color options.
Number Of Freeze-Thaw Cycles: 2 freeze-thaw cycles
Consent: The patient's consent was obtained including but not limited to risks of crusting, scabbing, blistering, scarring, darker or lighter pigmentary change, recurrence, incomplete removal and infection.
Medical Necessity Clause: This procedure was medically necessary because the lesions that were treated were: rubbed by clothing.
Post-Care Instructions: I reviewed with the patient in detail post-care instructions. Patient is to wear sunprotection, and avoid picking at any of the treated lesions. Pt may apply Vaseline to crusted or scabbing areas.
Add 52 Modifier (Optional): no
Detail Level: Detailed

## 2017-09-12 ENCOUNTER — APPOINTMENT (RX ONLY)
Dept: URBAN - METROPOLITAN AREA CLINIC 349 | Facility: CLINIC | Age: 42
Setting detail: DERMATOLOGY
End: 2017-09-12

## 2017-09-12 DIAGNOSIS — D22 MELANOCYTIC NEVI: ICD-10-CM

## 2017-09-12 DIAGNOSIS — L81.4 OTHER MELANIN HYPERPIGMENTATION: ICD-10-CM

## 2017-09-12 DIAGNOSIS — L82.0 INFLAMED SEBORRHEIC KERATOSIS: ICD-10-CM

## 2017-09-12 PROBLEM — D22.62 MELANOCYTIC NEVI OF LEFT UPPER LIMB, INCLUDING SHOULDER: Status: ACTIVE | Noted: 2017-09-12

## 2017-09-12 PROCEDURE — ? COUNSELING

## 2017-09-12 PROCEDURE — ? BENIGN DESTRUCTION

## 2017-09-12 PROCEDURE — ? LIQUID NITROGEN

## 2017-09-12 PROCEDURE — 99212 OFFICE O/P EST SF 10 MIN: CPT | Mod: 25

## 2017-09-12 PROCEDURE — 17110 DESTRUCTION B9 LES UP TO 14: CPT

## 2017-09-12 ASSESSMENT — LOCATION SIMPLE DESCRIPTION DERM
LOCATION SIMPLE: LEFT POSTERIOR UPPER ARM
LOCATION SIMPLE: LEFT CHEEK
LOCATION SIMPLE: RIGHT FOREHEAD

## 2017-09-12 ASSESSMENT — LOCATION ZONE DERM
LOCATION ZONE: ARM
LOCATION ZONE: FACE

## 2017-09-12 ASSESSMENT — LOCATION DETAILED DESCRIPTION DERM
LOCATION DETAILED: LEFT MEDIAL MALAR CHEEK
LOCATION DETAILED: LEFT PROXIMAL POSTERIOR UPPER ARM
LOCATION DETAILED: RIGHT INFERIOR FOREHEAD
LOCATION DETAILED: LEFT CENTRAL MALAR CHEEK

## 2017-09-12 NOTE — PROCEDURE: BENIGN DESTRUCTION
Add 52 Modifier (Optional): no
Treatment Number (Will Not Render If 0): 0
Detail Level: Detailed
Post-Care Instructions: I reviewed with the patient in detail post-care instructions. Patient is to wear sunprotection, and avoid picking at any of the treated lesions. Pt may apply Vaseline to crusted or scabbing areas.
Medical Necessity Information: It is in your best interest to select a reason for this procedure from the list below. All of these items fulfill various CMS LCD requirements except the new and changing color options.
Consent: The patient's consent was obtained including but not limited to risks of crusting, scabbing, blistering, scarring, darker or lighter pigmentary change, recurrence, incomplete removal and infection.
Anesthesia Volume In Cc: 0.5
Medical Necessity Clause: This procedure was medically necessary because the lesions that were treated were:

## 2017-09-12 NOTE — PROCEDURE: LIQUID NITROGEN
Include Z78.9 (Other Specified Conditions Influencing Health Status) As An Associated Diagnosis?: Yes
Consent: The patient's consent was obtained including but not limited to risks of crusting, scabbing, blistering, scarring, darker or lighter pigmentary change, recurrence, incomplete removal and infection.
Post-Care Instructions: I reviewed with the patient in detail post-care instructions. Patient is to wear sunprotection, and avoid picking at any of the treated lesions. Pt may apply Vaseline to crusted or scabbing areas.
Medical Necessity Clause: This procedure was medically necessary because the lesions that were treated were: rubbed by clothing.
Add 52 Modifier (Optional): no
Number Of Freeze-Thaw Cycles: 2 freeze-thaw cycles
Detail Level: Detailed
Medical Necessity Information: It is in your best interest to select a reason for this procedure from the list below. All of these items fulfill various CMS LCD requirements except the new and changing color options.

## 2017-12-29 PROBLEM — N81.10 PROLAPSE OF ANTERIOR VAGINAL WALL: Status: ACTIVE | Noted: 2017-12-29

## 2017-12-29 PROBLEM — N99.3 PELVIC RELAXATION DUE TO VAGINAL VAULT PROLAPSE, POSTHYSTERECTOMY: Status: ACTIVE | Noted: 2017-12-29

## 2018-04-12 ENCOUNTER — HOSPITAL ENCOUNTER (OUTPATIENT)
Dept: PHYSICAL THERAPY | Age: 43
Discharge: HOME | End: 2018-04-12
Payer: COMMERCIAL

## 2018-04-12 DIAGNOSIS — N99.3 PELVIC RELAXATION DUE TO VAGINAL VAULT PROLAPSE, POSTHYSTERECTOMY: ICD-10-CM

## 2018-04-12 DIAGNOSIS — R10.2 PELVIC PRESSURE IN FEMALE: ICD-10-CM

## 2018-04-12 DIAGNOSIS — N81.10 PROLAPSE OF ANTERIOR VAGINAL WALL: ICD-10-CM

## 2018-04-12 DIAGNOSIS — N81.89 PELVIC FLOOR RELAXATION: ICD-10-CM

## 2018-04-12 PROCEDURE — 97110 THERAPEUTIC EXERCISES: CPT

## 2018-04-12 PROCEDURE — 97161 PT EVAL LOW COMPLEX 20 MIN: CPT

## 2018-04-12 NOTE — THERAPY EVALUATION
Tomi Malcolm  : 1975  Primary: [facility]  Secondary:  [facility]  [facility] [address], [place] [address]  [phone]   [phone]        OUTPATIENT PHYSICAL THERAPY:Initial Assessment 2018    ICD-10: Treatment Diagnosis: R27.8 Lack of coordination, N99.3 prolapse of vaginal vault after hysterectomy, M62.81 Muscle weakness (generalized)  Precautions/Allergies:   Review of patient's allergies indicates no known allergies. Fall Risk Score: 0 (? 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 Elva Argueta presents with both a weak pelvic floor muscle (PFM) group. She has difficulty isolating her PFM to thoroughly engage the inferior core, but also demonstrates a lack of coordination and accessory muscle use of abdominals and glutes. This, in conjunction with weak lumbar multifidus and hip musculature and poor activation with activity, are contributing to increased pelvic heaviness and prolapse symptoms. I believe with skilled PT, with an emphasis on PFM retraining, core and hip stabilization, and body mechanics training she will decreased risk for furthering prolapse and improve symptoms. PROBLEM LIST (Impacting functional limitations):  1. Decreased Strength  2. Decreased ADL/Functional Activities  3. Decreased Activity Tolerance  4. Decreased [place] with Home Exercise Program  5. Decreased muscle coordination INTERVENTIONS PLANNED:  1. Home Exercise Program (HEP)  2. Manual Therapy  3. Neuromuscular Re-education/Strengthening  4. Range of Motion (ROM)  5.  Therapeutic Activites  6. Therapeutic Exercise/Strengthening   TREATMENT PLAN:  Effective Dates: 4/12/2018 TO 6/11/2018 (60 days). Frequency/Duration: 1 time a week for 60 Days  GOALS: (Goals have been discussed and agreed upon with patient.)  Short-Term Functional Goals: Time Frame: 4 weeks  1. Pt will be able to demonstrate proper isolated PFM contraction. 2. Pt will be able to coordinate PFM contraction with proper breathing for improved pressure  management. 3. Pt will be I in basic PFM contraction for improved coordination, strength, timing and awareness. Discharge Goals: Time Frame: 8 weeks  1. Pt will be able to demonstrate proper co-contraction with breathing during lifting for improved body mechanics. 2. Pt will increased strength to 3/5 for improved core support. 3. Pt will be I with advanced HEP for improved coordination and strength with proper mechanics. Rehabilitation Potential For Stated Goals: Good  Regarding Jason Malcolm's therapy, I certify that the treatment plan above will be carried out by a therapist or under their direction. Thank you for this referral,  Ese King, PT, DPT               The information in this section was collected on 4/12/2018 (except where otherwise noted). HISTORY:   History of Present Injury/Illness (Reason for Referral):  Pt is a 42 yo F referred to PT 2/2 pelvic pressure. Pt was diagnosed with large uterine fibroid and underwent supracervical partial hysterectomy March 2017. In November she states that she had the flu with a \"very bad cough\". She began \"feeling like things are falling out\". States that the longer that she is standing and on feet \"the more I can feel it\", worse at end of day. Tried to do online \"PT\" with video provided by pelvic health PT-- short and long holds. Urinary: daytime: every 3-4 hours, night time: occasionally 1x/night; Denies urinary leakage, frequency, dysuria, hematuria. Occasional incomplete emptying.  Fluid Intake: water 3-4 16oz bottles  Bowel: frequency: daily; Denies pain with BM, push/strain  Sexual: Denies pain with intercourse and history of pain with intercourse  Pelvic Organ Prolapse/Pelvic Pain: Pt reports increased sensation with prolonged standing and lifting. Past Medical History/Comorbidities:   Ms. Lamont Landon  has a past medical history of History of shingles (02/2017) and Uterine fibroid. She also has no past medical history of Aneurysm ([facility] [address] Arrhythmia; Arthritis; Asthma; Autoimmune disease ([address] CAD (coronary artery disease); Cancer ([address] Chronic kidney disease; Chronic obstructive pulmonary disease ([address] Chronic pain; Coagulation disorder ([address] Diabetes ([address] Endocarditis; GERD (gastroesophageal reflux disease); Heart failure ([address] Hypertension; Ill-defined condition; Liver disease; Psychiatric disorder; PUD (peptic ulcer disease); Rheumatic fever; Seizures ([facility] [address] Sleep apnea; Stroke [facility]); Thromboembolus ([facility] [address] or Thyroid disease. Ms. Lamont Landon  has a past surgical history that includes hx bilateral salpingectomy and hx laparoscopic supracervical hysterectomy (03/2017). Social History/Living Environment:     Pt lives with  and 3 kids. Oldest daughter is graduating this year. Prior Level of Function/Work/Activity:  [employer] . Also does independent writing. 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 Speed walking, biking and swimming 4x/week. Current Medications:     No current outpatient prescriptions on file. Date Last Reviewed:  4/12/2018   Number of Personal Factors/Comorbidities that affect the Plan of Care: 0: LOW COMPLEXITY   EXAMINATION:   Observation/Orthostatic Postural Assessment:          Accessory muscle use of abdominal and glutes with instruction for PFM contraction. Minimal closure, no lift with visualization of PFM contraction.  Able to visualize small bulge into vaginal canal.  Palpation:          Non tender throughout superficial and deep PF; mild tension at levator ani. ROM:          Limited 2/2 overactivity and weakness  Strength:  (in supine)        P: Power, E: Endurance, R: Repetitions, QF: Quick Flicks  P 1/5   E 10 seconds (breath holding present)   R Not tested due to incoordination   QF Not tested due to incoordination     Coordination:          Pt demonstrates breath holding patterns and accessory muscle use with PFM activation. Body Structures Involved:  1. Muscles Body Functions Affected:  1. Sensory/Pain  2. Genitourinary  3. Neuromusculoskeletal  4. Movement Related Activities and Participation Affected:  1. General Tasks and Demands  2. Mobility  3. Self Care   Number of elements (examined above) that affect the Plan of Care: 1-2: LOW COMPLEXITY   CLINICAL PRESENTATION:   Presentation: Stable and uncomplicated: LOW COMPLEXITY   CLINICAL DECISION MAKING:   Outcome Measure:   Pelvic Floor Distress Inventory - Short form (PFDI-20)  Score (out of 300) Initial: 4/12/2018  Pelvic:  Bowel:  Bladder: Total: Most Recent:      Interpretation of Score: This survey asks questions concerning certain bowel, bladder, or pelvic symptoms and how much these symptoms interfere with daily activities. Each section is scored on a 0-4 scale, 4 representing the greatest disability. The scores of each section (out of 100) are added together for a total score out of 300. Score 0 1-59  120-179 180-239 240-299 300   Modifier CH CI CJ CK CL CM CN     Medical Necessity:   · Patient is expected to demonstrate progress in strength, range of motion, coordination and functional technique to decrease pelvic pressure with activity. Reason for Services/Other Comments:  · Patient continues to require skilled intervention due to above mentioned deficits. .   Use of outcome tool(s) and clinical judgement create a POC that gives a: Clear prediction of patient's progress: LOW COMPLEXITY            TREATMENT:   (In addition to Assessment/Re-Assessment sessions the following treatments were rendered)  Pre-treatment Symptoms/Complaints:  See subjective history. Pain: Initial:   Pain Intensity 1: 0  Post Session:  0     THERAPEUTIC EXERCISE: (20 minutes):  Exercises per grid below to improve coordination. Required moderate verbal cues to promote proper body breathing techniques and isolation of PFM contraction. Progressed resistance, range, repetitions and complexity of movement as indicated. Date:  4/12/2018 Date:   Date:     Activity/Exercise Parameters Parameters Parameters   HEP Kegel- QF, 5H/10R w/ focus on isolation and breathing     Kegel Isolation of PFM contraction     Patient education pessary                                   Treatment/Session Assessment:    · Response to Treatment:  Pt reports good understanding of plan of care, as well as prescribed home exercise program.  All questions were answered to pt's satisfaction. Pt was invited to call with any further questions or concerns. · Compliance with Program/Exercises: Will assess as treatment progresses. · Recommendations/Intent for next treatment session: \"Next visit will focus on isolation and coordination of PFM contraction, biofeedback, breathing/contraction\".   Total Treatment Duration: Evaluation 35 minutes, treatment 20 minutes  PT Patient Time In/Time Out  Time In: 1008  Time Out: Porter [address] Nix, PT, DPT

## 2018-04-12 NOTE — PROGRESS NOTES
Ambulatory/Rehab Services H2 Model Falls Risk Assessment    Risk Factor Pts. ·   Confusion/Disorientation/Impulsivity  []    4 ·   Symptomatic Depression  []   2 ·   Altered Elimination  []   1 ·   Dizziness/Vertigo  []   1 ·   Gender (Male)  []   1 ·   Any administered antiepileptics (anticonvulsants):  []   2 ·   Any administered benzodiazepines:  []   1 ·   Visual Impairment (specify):  []   1 ·   Portable Oxygen Use  []   1 ·   Orthostatic ? BP  []   1 ·   History of Recent Falls (within 3 mos.)  []   5     Ability to Rise from Chair (choose one) Pts. ·   Ability to rise in a single movement  []   0 ·   Pushes up, successful in one attempt  []   1 ·   Multiple attempts, but successful  []   3 ·   Unable to rise without assistance  []   4   Total: (5 or greater = High Risk) 0     Falls Prevention Plan:   []                Physical Limitations to Exercise (specify):   []                Mobility Assistance Device (type):   []                Exercise/Equipment Adaptation (specify):    ©2010 [facility] of [address] States Patent #[id number].  Federal Law prohibits the replication, distribution or use without written permission from [facility] [employer]

## 2018-04-19 ENCOUNTER — HOSPITAL ENCOUNTER (OUTPATIENT)
Dept: PHYSICAL THERAPY | Age: 43
Discharge: HOME | End: 2018-04-19
Payer: COMMERCIAL

## 2018-04-19 PROCEDURE — 97110 THERAPEUTIC EXERCISES: CPT

## 2018-04-19 NOTE — PROGRESS NOTES
Guanako Malcolm  : 1975  Primary: [address] [facility]  Secondary:  [facility] [place] [address], [address], [place] [address]  [phone]   [phone]        OUTPATIENT PHYSICAL THERAPY:Initial Assessment 2018    ICD-10: Treatment Diagnosis: R27.8 Lack of coordination, N99.3 prolapse of vaginal vault after hysterectomy, M62.81 Muscle weakness (generalized)  Precautions/Allergies:   Review of patient's allergies indicates no known allergies. Fall Risk Score: 0 (? 5 = High Risk)  MD Orders: eval and treat MEDICAL/REFERRING DIAGNOSIS:  Other female genital prolapse [N81.89]   DATE OF ONSET: 2017  REFERRING PHYSICIAN: Nehemias Flowers NP  RETURN PHYSICIAN APPOINTMENT: Not scheduled     INITIAL ASSESSMENT:  Ms. Anibal Muñoz presents with a weak pelvic floor muscle (PFM) group. She has difficulty isolating her PFM to thoroughly engage the inferior core, but also demonstrates a lack of coordination and accessory muscle use of abdominals and glutes. This, in conjunction with weak lumbar multifidus and hip musculature and poor activation with activity, are contributing to increased pelvic heaviness and prolapse symptoms. I believe with skilled PT, with an emphasis on PFM retraining, core and hip stabilization, and body mechanics training she will decreased risk for furthering prolapse and improve symptoms. PROBLEM LIST (Impacting functional limitations):  1. Decreased Strength  2. Decreased ADL/Functional Activities  3. Decreased Activity Tolerance  4. Decreased [place] with Home Exercise Program  5. Decreased muscle coordination INTERVENTIONS PLANNED:  1. Home Exercise Program (HEP)  2. Manual Therapy  3. Neuromuscular Re-education/Strengthening  4. Range of Motion (ROM)  5. Therapeutic Activites  6. Therapeutic Exercise/Strengthening   TREATMENT PLAN:  Effective Dates: 2018 TO 2018 (60 days).   Frequency/Duration: 1 time a week for 60 Days  GOALS: (Goals have been discussed and agreed upon with patient.)  Short-Term Functional Goals: Time Frame: 4 weeks  1. Pt will be able to demonstrate proper isolated PFM contraction. 2. Pt will be able to coordinate PFM contraction with proper breathing for improved pressure  management. 3. Pt will be I in basic PFM contraction for improved coordination, strength, timing and awareness. Discharge Goals: Time Frame: 8 weeks  1. Pt will be able to demonstrate proper co-contraction with breathing during lifting for improved body mechanics. 2. Pt will increased strength to 3/5 for improved core support. 3. Pt will be I with advanced HEP for improved coordination and strength with proper mechanics. Rehabilitation Potential For Stated Goals: Good             The information in this section was collected on 4/12/2018 (except where otherwise noted). HISTORY:   History of Present Injury/Illness (Reason for Referral):  Pt is a 42 yo F referred to PT 2/2 pelvic pressure. Pt was diagnosed with large uterine fibroid and underwent supracervical partial hysterectomy March 2017. In November she states that she had the flu with a \"very bad cough\". She began \"feeling like things are falling out\". States that the longer that she is standing and on feet \"the more I can feel it\", worse at end of day. Tried to do online \"PT\" with video provided by pelvic health PT-- short and long holds. Urinary: daytime: every 3-4 hours, night time: occasionally 1x/night; Denies urinary leakage, frequency, dysuria, hematuria. Occasional incomplete emptying. Fluid Intake: water 3-4 16oz bottles  Bowel: frequency: daily; Denies pain with BM, push/strain  Sexual: Denies pain with intercourse and history of pain with intercourse  Pelvic Organ Prolapse/Pelvic Pain: Pt reports increased sensation with prolonged standing and lifting.   Past Medical History/Comorbidities:   Ms. Daysi Zapien  has a past medical history of History of shingles (02/2017) and Uterine fibroid. She also has no past medical history of Aneurysm ([facility] [facility] Arrhythmia; Arthritis; Asthma; Autoimmune disease ([facility] [address] CAD (coronary artery disease); Cancer ([facility] [address] [facility] Chronic kidney disease; Chronic obstructive pulmonary disease ([facility] [address] [facility] Chronic pain; Coagulation disorder ([facility] [address] Diabetes ([facility] [address] Endocarditis; GERD (gastroesophageal reflux disease); Heart failure ([facility] [address] [facility] Hypertension; Ill-defined condition; Liver disease; Psychiatric disorder; PUD (peptic ulcer disease); Rheumatic fever; Seizures ([facility] [address] Sleep apnea; Stroke [facility]); [facility] ([facility] or Thyroid disease. Ms. Doris Stone  has a past surgical history that includes hx bilateral salpingectomy and hx laparoscopic supracervical hysterectomy (03/2017). Social History/Living Environment:     Pt lives with  and 3 kids. Oldest daughter is graduating this year. Prior Level of Function/Work/Activity:  [employer] . Also does independent writing. 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 Speed walking, biking and swimming 4x/week. Current Medications:     No current outpatient prescriptions on file. Date Last Reviewed:  4/19/2018   EXAMINATION:   Observation/Orthostatic Postural Assessment:          Accessory muscle use of abdominal and glutes with instruction for PFM contraction. Minimal closure, no lift with visualization of PFM contraction. Able to visualize small bulge into vaginal canal.  Palpation:          Non tender throughout superficial and deep PF; mild tension at levator ani. ROM:          Limited 2/2 overactivity and weakness  Strength:  (in supine)        P: Power, E: Endurance, R: Repetitions, QF: Quick Flicks  P 1/5   E 10 seconds (breath holding present)   R Not tested due to incoordination   QF Not tested due to incoordination   Coordination:          Pt demonstrates breath holding patterns and accessory muscle use with PFM activation.     CLINICAL PRESENTATION:   CLINICAL DECISION MAKING:   Outcome Measure:   Pelvic Floor Distress Inventory - Short form (PFDI-20)  Score (out of 300) Initial: 4/12/2018  Pelvic:  Bowel:  Bladder: Total: Most Recent:      Interpretation of Score: This survey asks questions concerning certain bowel, bladder, or pelvic symptoms and how much these symptoms interfere with daily activities. Each section is scored on a 0-4 scale, 4 representing the greatest disability. The scores of each section (out of 100) are added together for a total score out of 300. Score 0 1-59  120-179 180-239 240-299 300   Modifier CH CI CJ CK CL CM CN     Medical Necessity:   · Patient is expected to demonstrate progress in strength, range of motion, coordination and functional technique to decrease pelvic pressure with activity. Reason for Services/Other Comments:  · Patient continues to require skilled intervention due to above mentioned deficits. .            TREATMENT:   (In addition to Assessment/Re-Assessment sessions the following treatments were rendered)  Pre-treatment Symptoms/Complaints:  Pt reports compliance with HEP. She states that she feels is is better able to isolate muscles without using abdominals as much and is doing better with breathing. Pain: Initial:   Pain Intensity 1: 0  Post Session:  0     THERAPEUTIC EXERCISE: (55 minutes):  Exercises per grid below to improve strength and coordination. Required minimal verbal and tactile cues to proper mm recruitment. Progressed resistance, range, repetitions and complexity of movement as indicated.     Date:  4/12/2018 Date:  4/19/2018 Date:     Activity/Exercise Parameters Parameters Parameters   HEP Kegel- QF, 5H/10R w/ focus on isolation and breathing     Kegel Isolation of PFM contraction     Patient education pessary Role of core in management of pressure mechanics and effect on prolapse    Biofeedback  PF/TA sensors- 10H/10R, QF 3x5, TA/PF bracing 10x10s- 40 minutes    Bridge  3x10    Supine marching  3x10 Treatment/Session Assessment:    · Response to Treatment:  Pt demonstrate significant improvements in PFM isolation and awareness of breathing during exercises today. sEMG shows good quality of hold during kegel and with TA/PF co-contraction. Pt was able to maintain good breathing, as well as good co-contraction during addition of movement including supine marching and bridging. · Compliance with Program/Exercises: Pt reports compliance with HEP. · Recommendations/Intent for next treatment session: \"Next visit will focus on advancement of movement with co-contraction, lifting mechanics\".   Total Treatment Duration: 55 minutes  PT Patient Time In/Time Out  Time In: 5523  Time Out: [address] [facility], PT, DPT

## 2018-04-26 ENCOUNTER — HOSPITAL ENCOUNTER (OUTPATIENT)
Dept: PHYSICAL THERAPY | Age: 43
Discharge: HOME | End: 2018-04-26
Payer: COMMERCIAL

## 2018-04-26 PROCEDURE — 97110 THERAPEUTIC EXERCISES: CPT

## 2018-04-26 NOTE — PROGRESS NOTES
Liza Malcolm  : 1975  Primary: [address] [facility]  Secondary:  [facility] [address], [place] [address]  [phone]   [phone]        OUTPATIENT PHYSICAL THERAPY:Daily Note 2018    ICD-10: Treatment Diagnosis: R27.8 Lack of coordination, N99.3 prolapse of vaginal vault after hysterectomy, M62.81 Muscle weakness (generalized)  Precautions/Allergies:   Review of patient's allergies indicates no known allergies. Fall Risk Score: 0 (? 5 = High Risk)  MD Orders: eval and treat MEDICAL/REFERRING DIAGNOSIS:  Other female genital prolapse [N81.89]   DATE OF ONSET: 2017  REFERRING PHYSICIAN: Chuck Vela NP  RETURN PHYSICIAN APPOINTMENT: Not scheduled     INITIAL ASSESSMENT:  Ms. Gustavo Moritz presents with a weak pelvic floor muscle (PFM) group. She has difficulty isolating her PFM to thoroughly engage the inferior core, but also demonstrates a lack of coordination and accessory muscle use of abdominals and glutes. This, in conjunction with weak lumbar multifidus and hip musculature and poor activation with activity, are contributing to increased pelvic heaviness and prolapse symptoms. I believe with skilled PT, with an emphasis on PFM retraining, core and hip stabilization, and body mechanics training she will decreased risk for furthering prolapse and improve symptoms. PROBLEM LIST (Impacting functional limitations):  1. Decreased Strength  2. Decreased ADL/Functional Activities  3. Decreased Activity Tolerance  4. Decreased [place] with Home Exercise Program  5. Decreased muscle coordination INTERVENTIONS PLANNED:  1. Home Exercise Program (HEP)  2. Manual Therapy  3. Neuromuscular Re-education/Strengthening  4. Range of Motion (ROM)  5. Therapeutic Activites  6. Therapeutic Exercise/Strengthening   TREATMENT PLAN:  Effective Dates: 2018 TO 2018 (60 days).   Frequency/Duration: 1 time a week for 60 Days  GOALS: (Goals have been discussed and agreed upon with patient.)  Short-Term Functional Goals: Time Frame: 4 weeks  1. Pt will be able to demonstrate proper isolated PFM contraction. 2. Pt will be able to coordinate PFM contraction with proper breathing for improved pressure  management. 3. Pt will be I in basic PFM contraction for improved coordination, strength, timing and awareness. Discharge Goals: Time Frame: 8 weeks  1. Pt will be able to demonstrate proper co-contraction with breathing during lifting for improved body mechanics. 2. Pt will increased strength to 3/5 for improved core support. 3. Pt will be I with advanced HEP for improved coordination and strength with proper mechanics. Rehabilitation Potential For Stated Goals: Good             The information in this section was collected on 4/12/2018 (except where otherwise noted). HISTORY:   History of Present Injury/Illness (Reason for Referral):  Pt is a 42 yo F referred to PT 2/2 pelvic pressure. Pt was diagnosed with large uterine fibroid and underwent supracervical partial hysterectomy March 2017. In November she states that she had the flu with a \"very bad cough\". She began \"feeling like things are falling out\". States that the longer that she is standing and on feet \"the more I can feel it\", worse at end of day. Tried to do online \"PT\" with video provided by pelvic health PT-- short and long holds. Urinary: daytime: every 3-4 hours, night time: occasionally 1x/night; Denies urinary leakage, frequency, dysuria, hematuria. Occasional incomplete emptying. Fluid Intake: water 3-4 16oz bottles  Bowel: frequency: daily; Denies pain with BM, push/strain  Sexual: Denies pain with intercourse and history of pain with intercourse  Pelvic Organ Prolapse/Pelvic Pain: Pt reports increased sensation with prolonged standing and lifting.   Past Medical History/Comorbidities:   Ms. Carlton Giraldo  has a past medical history of History of shingles (02/2017) and Uterine fibroid. She also has no past medical history of Aneurysm ([facility] [address] Arrhythmia; Arthritis; Asthma; Autoimmune disease ([facility] [address] CAD (coronary artery disease); Cancer ([facility] [address] Chronic kidney disease; Chronic obstructive pulmonary disease ([facility] [address] Chronic pain; Coagulation disorder ([facility] [address] [facility] Diabetes ([facility] [address] [facility] Endocarditis; GERD (gastroesophageal reflux disease); Heart failure ([facility] [address] [facility] Hypertension; Ill-defined condition; Liver disease; Psychiatric disorder; PUD (peptic ulcer disease); Rheumatic fever; [facility] Sleep apnea; Stroke [facility]); Th[facility]boembolus ([facility] or Thyroid disease. Ms. Andreas Quick  has a past surgical history that includes hx bilateral salpingectomy and hx laparoscopic supracervical hysterectomy (03/2017). Social History/Living Environment:     Pt lives with  and 3 kids. Oldest daughter is graduating this year. Prior Level of Function/Work/Activity:  [employer] . Also does independent writing. 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 Speed walking, biking and swimming 4x/week. Current Medications:     No current outpatient prescriptions on file. Date Last Reviewed:  4/26/2018   EXAMINATION:   Observation/Orthostatic Postural Assessment:          Accessory muscle use of abdominal and glutes with instruction for PFM contraction. Minimal closure, no lift with visualization of PFM contraction. Able to visualize small bulge into vaginal canal.  Palpation:          Non tender throughout superficial and deep PF; mild tension at levator ani. ROM:          Limited 2/2 overactivity and weakness  Strength:  (in supine)        P: Power, E: Endurance, R: Repetitions, QF: Quick Flicks  P 1/5   E 10 seconds (breath holding present)   R Not tested due to incoordination   QF Not tested due to incoordination   Coordination:          Pt demonstrates breath holding patterns and accessory muscle use with PFM activation.     CLINICAL PRESENTATION:   CLINICAL DECISION MAKING:   Outcome Measure:   Pelvic Floor Distress Inventory - Short form (PFDI-20)  Score (out of 300) Initial: 4/12/2018  Pelvic:  Bowel:  Bladder: Total: Most Recent:      Interpretation of Score: This survey asks questions concerning certain bowel, bladder, or pelvic symptoms and how much these symptoms interfere with daily activities. Each section is scored on a 0-4 scale, 4 representing the greatest disability. The scores of each section (out of 100) are added together for a total score out of 300. Score 0 1-59  120-179 180-239 240-299 300   Modifier CH CI CJ CK CL CM CN     Medical Necessity:   · Patient is expected to demonstrate progress in strength, range of motion, coordination and functional technique to decrease pelvic pressure with activity. Reason for Services/Other Comments:  · Patient continues to require skilled intervention due to above mentioned deficits. .            TREATMENT:   (In addition to Assessment/Re-Assessment sessions the following treatments were rendered)  Pre-treatment Symptoms/Complaints:  Pt states that she has been doing a lot of housework getting house ready for possible move this summer- cleaning, painting, etc. She's had a little heaviness this week with increased physical work load. Has been compliant with exercises; \"I'm not sure if I'm keep everything contracted with the bridge. \"  Pain: Initial:   Pain Intensity 1: 0  Post Session:  0     THERAPEUTIC EXERCISE: (55 minutes):  Exercises per grid below to improve strength and coordination. Required minimal verbal and tactile cues to proper breathing with progression of exercises. Progressed resistance, range, repetitions and complexity of movement as indicated.     Date:  4/12/2018 Date:  4/19/2018 Date:  4/26/2018     Activity/Exercise Parameters Parameters Parameters   HEP Kegel- QF, 5H/10R w/ focus on isolation and breathing     Kegel Isolation of PFM contraction  10H/5R x 3 in supine w/ manual palpation; 10 x10s in quadruped   Patient education pessary Role of core in management of pressure mechanics and effect on prolapse    Biofeedback  PF/TA sensors- 10H/10R, QF 3x5, TA/PF bracing 10x10s- 40 minutes    TA/PF co-contraction   10 x 10s   Bridge  3x10 3x10   Marching  3x10 3x10 (supine)   Squatting   3x10 (on shuttle) 50# (2 sets w/ hip abd- red)   Quadruped hip extension   2x10                               Treatment/Session Assessment:    · Response to Treatment:  Pt demonstrate good coordination of PF/TA co-contraction and isolated PFM contraction. She does have slightly increased delay of PFM realxation with co-contraction, however was able to decreased relaxation time between kegel holds for progression of endurance. Exercises in various positions today to challenge strength and coordination for progression to incorporation during functional movement. · Compliance with Program/Exercises: Pt reports compliance with HEP. · Recommendations/Intent for next treatment session: \"Next visit will focus on continuing to progress challenge of movement to seated and standing activities\".   Total Treatment Duration: 55 minutes  PT Patient Time In/Time Out  Time In: 0902  Time Out: 1004    Viji Giron, PT, DPT

## 2018-05-03 ENCOUNTER — HOSPITAL ENCOUNTER (OUTPATIENT)
Dept: PHYSICAL THERAPY | Age: 43
Discharge: HOME | End: 2018-05-03
Payer: COMMERCIAL

## 2018-05-03 PROCEDURE — 97110 THERAPEUTIC EXERCISES: CPT

## 2018-05-03 NOTE — PROGRESS NOTES
Daniel Tatyana Malcolm  : 1975  Primary: [facility]  Secondary:  [facility]  [facility] [address], [place] [address]  [phone]   [phone]        OUTPATIENT PHYSICAL THERAPY:Daily Note 5/3/2018    ICD-10: Treatment Diagnosis: R27.8 Lack of coordination, N99.3 prolapse of vaginal vault after hysterectomy, M62.81 Muscle weakness (generalized)  Precautions/Allergies:   Review of patient's allergies indicates no known allergies. Fall Risk Score: 0 (? 5 = High Risk)  MD Orders: eval and treat MEDICAL/REFERRING DIAGNOSIS:  Other female genital prolapse [N81.89]   DATE OF ONSET: 2017  REFERRING PHYSICIAN: Elena Sue NP  RETURN PHYSICIAN APPOINTMENT: Not scheduled     INITIAL ASSESSMENT:  Ms. Isabel Lovelace presents with a weak pelvic floor muscle (PFM) group. She has difficulty isolating her PFM to thoroughly engage the inferior core, but also demonstrates a lack of coordination and accessory muscle use of abdominals and glutes. This, in conjunction with weak lumbar multifidus and hip musculature and poor activation with activity, are contributing to increased pelvic heaviness and prolapse symptoms. I believe with skilled PT, with an emphasis on PFM retraining, core and hip stabilization, and body mechanics training she will decreased risk for furthering prolapse and improve symptoms. PROBLEM LIST (Impacting functional limitations):  1. Decreased Strength  2. Decreased ADL/Functional Activities  3. Decreased Activity Tolerance  4. Decreased [place] with Home Exercise Program  5. Decreased muscle coordination INTERVENTIONS PLANNED:  1. Home Exercise Program (HEP)  2. Manual Therapy  3. Neuromuscular Re-education/Strengthening  4. Range of Motion (ROM)  5. Therapeutic Activites  6. Therapeutic Exercise/Strengthening   TREATMENT PLAN:  Effective Dates: 2018 TO 2018 (60 days).   Frequency/Duration: 1 time a week for 60 Days  GOALS: (Goals have been discussed and agreed upon with patient.)  Short-Term Functional Goals: Time Frame: 4 weeks  1. Pt will be able to demonstrate proper isolated PFM contraction. 2. Pt will be able to coordinate PFM contraction with proper breathing for improved pressure  management. 3. Pt will be I in basic PFM contraction for improved coordination, strength, timing and awareness. Discharge Goals: Time Frame: 8 weeks  1. Pt will be able to demonstrate proper co-contraction with breathing during lifting for improved body mechanics. 2. Pt will increased strength to 3/5 for improved core support. 3. Pt will be I with advanced HEP for improved coordination and strength with proper mechanics. Rehabilitation Potential For Stated Goals: Good             The information in this section was collected on 4/12/2018 (except where otherwise noted). HISTORY:   History of Present Injury/Illness (Reason for Referral):  Pt is a 40 yo F referred to PT 2/2 pelvic pressure. Pt was diagnosed with large uterine fibroid and underwent supracervical partial hysterectomy March 2017. In November she states that she had the flu with a \"very bad cough\". She began \"feeling like things are falling out\". States that the longer that she is standing and on feet \"the more I can feel it\", worse at end of day. Tried to do online \"PT\" with video provided by pelvic health PT-- short and long holds. Urinary: daytime: every 3-4 hours, night time: occasionally 1x/night; Denies urinary leakage, frequency, dysuria, hematuria. Occasional incomplete emptying. Fluid Intake: water 3-4 16oz bottles  Bowel: frequency: daily; Denies pain with BM, push/strain  Sexual: Denies pain with intercourse and history of pain with intercourse  Pelvic Organ Prolapse/Pelvic Pain: Pt reports increased sensation with prolonged standing and lifting.   Past Medical History/Comorbidities:   Ms. Rickey Quigley  has a past medical history of History of shingles (02/2017) and Uterine fibroid. She also has no past medical history of Aneurysm ([facility] Arrhythmia; Arthritis; Asthma; Autoimmune disease ([facility] CAD (coronary artery disease); Cancer ([facility] Chronic kidney disease; Chronic obstructive pulmonary disease ([facility] Chronic pain; Coagulation disorder ([facility] Diabetes ([facility] [address] [facility] Endocarditis; GERD (gastroesophageal reflux disease); Heart failure ([facility] [facility] Hypertension; Ill-defined condition; Liver disease; Psychiatric disorder; PUD (peptic ulcer disease); Rheumatic fever; Seizures ([facility] [facility] Sleep apnea; Stroke [facility]); [facility] or Thyroid disease. Ms. Sarah Hammer  has a past surgical history that includes hx bilateral salpingectomy and hx laparoscopic supracervical hysterectomy (03/2017). Social History/Living Environment:     Pt lives with  and 3 kids. Oldest daughter is graduating this year. Prior Level of Function/Work/Activity:  [employer] . Also does independent writing. 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 Speed walking, biking and swimming 4x/week. Current Medications:     No current outpatient prescriptions on file. Date Last Reviewed:  5/3/2018   EXAMINATION:   Observation/Orthostatic Postural Assessment:          Accessory muscle use of abdominal and glutes with instruction for PFM contraction. Minimal closure, no lift with visualization of PFM contraction. Able to visualize small bulge into vaginal canal.  Palpation:          Non tender throughout superficial and deep PF; mild tension at levator ani. ROM:          Limited 2/2 overactivity and weakness  Strength:  (in supine)        P: Power, E: Endurance, R: Repetitions, QF: Quick Flicks  P 1/5   E 10 seconds (breath holding present)   R Not tested due to incoordination   QF Not tested due to incoordination   Coordination:          Pt demonstrates breath holding patterns and accessory muscle use with PFM activation.     CLINICAL PRESENTATION:   CLINICAL DECISION MAKING:   Outcome Measure:   Pelvic Floor Distress Inventory - Short form (PFDI-20)  Score (out of 300) Initial: 4/12/2018  Pelvic:  Bowel:  Bladder: Total: Most Recent:      Interpretation of Score: This survey asks questions concerning certain bowel, bladder, or pelvic symptoms and how much these symptoms interfere with daily activities. Each section is scored on a 0-4 scale, 4 representing the greatest disability. The scores of each section (out of 100) are added together for a total score out of 300. Score 0 1-59  120-179 180-239 240-299 300   Modifier CH CI CJ CK CL CM CN     Medical Necessity:   · Patient is expected to demonstrate progress in strength, range of motion, coordination and functional technique to decrease pelvic pressure with activity. Reason for Services/Other Comments:  · Patient continues to require skilled intervention due to above mentioned deficits. .            TREATMENT:   (In addition to Assessment/Re-Assessment sessions the following treatments were rendered)  Pre-treatment Symptoms/Complaints: Pt reports completing HEP 2x/day. Slightly improved for last week. Will see Dr. Benitez End on Monday 5/7/18. Pain: Initial:   Pain Intensity 1: 0  Post Session:  0     THERAPEUTIC EXERCISE: (57 minutes):  Exercises per grid below to improve strength and coordination. Required minimal verbal and tactile cues to proper breathing with progression of exercises. Progressed resistance, range, repetitions and complexity of movement as indicated. Date:  4/12/2018 Date:  4/19/2018 Date:  4/26/2018   Date:  5/3/2018   Activity/Exercise Parameters Parameters Parameters Parameters   HEP Kegel- QF, 5H/10R w/ focus on isolation and breathing   See below.    Kegel Isolation of PFM contraction  10H/5R x 3 in supine w/ manual palpation; 10 x10s in quadruped 10H/5R x 3 and QF in supine w/ manual palpation   Patient education pessary Role of core in management of pressure mechanics and effect on prolapse     Biofeedback  PF/TA sensors- 10H/10R, QF 3x5, TA/PF bracing 10x10s- 40 minutes     TA/PF co-contraction   10 x 10s Seated on SB and in quadruped on foam roll 2x10, 10s holds   Bridge  3x10 3x10 30, 5s hold w/ hip abd   Marching  3x10 3x10 (supine) 3x10 on SB w/ co-contraction   Squatting   3x10 (on shuttle) 50# (2 sets w/ hip abd- red)    Quadruped hip extension   2x10 2x8 (on double foam)   Rows    3x10 w/ co-contraction (3#/7#)   SL bridge                        HOME EXERCISE PROGRAM:  Exercises   Beginner Front Arm Support - 10 reps - 3 sets - 3 hold - 2x daily - 7x weekly   Bridge with Hip Abduction and Resistance - 20 reps - 2 sets - 5 hold - 2x daily - 7x weekly   Swiss Ball March - 10 reps - 3 sets - 2x daily - 7x weekly   Kegel holds 10H/5R x10 reps, seated and standing 3-4x/day    Treatment/Session Assessment:    · Response to Treatment:  Pt continues to progress well with strengthening with good awareness. She does require cuing for slowing down exercises and maintaining proper alignment during LE hip extension on unstable surface. HEP progressed for increased challenge. · Compliance with Program/Exercises: Pt reports compliance with HEP. · Recommendations/Intent for next treatment session: \"Next visit will focus on continuing to progress challenge of movement to seated and standing activities\".   Total Treatment Duration: 57 minutes  PT Patient Time In/Time Out  Time In: 0932  Time Out: [address] [place], PT, DPT

## 2018-05-09 NOTE — THERAPY DISCHARGE
Jason Malcolm  : 1975  Primary: [address] [facility]  Secondary:  [facility] [place] [place] [place] [address], [address]  [phone]   [phone]        OUTPATIENT PHYSICAL [address] [facility] 5/10/2018    ICD-10: Treatment Diagnosis: R27.8 Lack of coordination, N99.3 prolapse of vaginal vault after hysterectomy, M62.81 Muscle weakness (generalized)  Precautions/Allergies:   Review of patient's allergies indicates no known allergies. Fall Risk Score: 0 (? 5 = High Risk)  MD Orders: eval and treat MEDICAL/REFERRING DIAGNOSIS:  Other female genital prolapse [N81.89]   DATE OF ONSET: 2017  REFERRING PHYSICIAN: Shravan Santana NP  RETURN PHYSICIAN APPOINTMENT: Not scheduled     INITIAL ASSESSMENT:  Ms. Oswald Chapman was seen in skilled PT from 2018 to 5/3/2018, a total of 4 visits. Treatment has emphasized coordination of PFM with body mechanics and normal breathing and strengthening. She demonstrates good understanding and compliance with HEP. At this time she would like to continue independently with HEP in addition to use of a pessary, which she was fitted for today. She was invited to call with any questions or concerns as needed. PROBLEM LIST (Impacting functional limitations):  1. Decreased Strength  2. Decreased ADL/Functional Activities  3. Decreased Activity Tolerance  4. Decreased [place] with Home Exercise Program  5. Decreased muscle coordination INTERVENTIONS PLANNED:  1. Home Exercise Program (HEP)  2. Manual Therapy  3. Neuromuscular Re-education/Strengthening  4. Range of Motion (ROM)  5. Therapeutic Activites  6. Therapeutic Exercise/Strengthening   TREATMENT PLAN:  Effective Dates: 2018 TO 2018 (60 days). Frequency/Duration: 1 time a week for 60 Days  GOALS: (Goals have been discussed and agreed upon with patient.)  Short-Term Functional Goals: Time Frame: 4 weeks  1.  Pt will be able to demonstrate proper isolated PFM contraction. (MET 5/3/2018)  2. Pt will be able to coordinate PFM contraction with proper breathing for improved pressure  management. (MET 5/3/2018)  3. Pt will be I in basic PFM contraction for improved coordination, strength, timing and awareness. (MET 5/3/2018)  Discharge Goals: Time Frame: 8 weeks  1. Pt will be able to demonstrate proper co-contraction with breathing during lifting for improved body mechanics. (Unable to assess due to pt request to D/C to HEP)  2. Pt will increased strength to 3/5 for improved core support. (Unable to assess due to pt request to D/C to HEP)  3. Pt will be I with advanced HEP for improved coordination and strength with proper mechanics. (Unable to assess due to pt request to D/C to HEP)  Rehabilitation Potential For Stated Goals: Yolanda Holman, PT, DPT

## 2018-05-10 ENCOUNTER — HOSPITAL ENCOUNTER (OUTPATIENT)
Dept: PHYSICAL THERAPY | Age: 43
Discharge: HOME | End: 2018-05-10
Payer: COMMERCIAL

## 2018-05-17 ENCOUNTER — APPOINTMENT (OUTPATIENT)
Dept: PHYSICAL THERAPY | Age: 43
End: 2018-05-17
Payer: COMMERCIAL

## 2018-05-24 ENCOUNTER — APPOINTMENT (OUTPATIENT)
Dept: PHYSICAL THERAPY | Age: 43
End: 2018-05-24
Payer: COMMERCIAL

## 2018-05-31 ENCOUNTER — APPOINTMENT (OUTPATIENT)
Dept: PHYSICAL THERAPY | Age: 43
End: 2018-05-31
Payer: COMMERCIAL

## (undated) DEVICE — LAP CHOLE: Brand: MEDLINE INDUSTRIES, INC.

## (undated) DEVICE — PROGRASP FORCEPS: Brand: ENDOWRIST;DAVINCI SI

## (undated) DEVICE — BLADELESS OPTICAL TROCAR WITH FIXATION CANNULA: Brand: VERSAONE

## (undated) DEVICE — FENESTRATED BIPOLAR FORCEPS: Brand: ENDOWRIST;DAVINCI SI

## (undated) DEVICE — TROCAR: Brand: KII FIOS FIRST ENTRY

## (undated) DEVICE — MEGA NEEDLE DRIVER: Brand: ENDOWRIST;DAVINCI SI

## (undated) DEVICE — OBTRTR BLDELSS 8MM DISP -- DA VINCI - SNGL USE

## (undated) DEVICE — FILTER SMK EVAC FLO CLR MEGADYNE

## (undated) DEVICE — MEDI-VAC YANKAUER SUCTION HANDLE W/BULBOUS TIP: Brand: CARDINAL HEALTH

## (undated) DEVICE — CONTAINER SPEC HISTOLOGY 900ML POLYPR

## (undated) DEVICE — DRAPE INSTR ARM ROBOTIC ENDOWRIST DA VINCI S

## (undated) DEVICE — TRAY PREP DRY W/ PREM GLV 2 APPL 6 SPNG 2 UNDPD 1 OVERWRAP

## (undated) DEVICE — [HIGH FLOW INSUFFLATOR,  DO NOT USE IF PACKAGE IS DAMAGED,  KEEP DRY,  KEEP AWAY FROM SUNLIGHT,  PROTECT FROM HEAT AND RADIOACTIVE SOURCES.]: Brand: PNEUMOSURE

## (undated) DEVICE — MONOPOLAR CURVED SCISSORS: Brand: ENDOWRIST

## (undated) DEVICE — CARDINAL HEALTH FLEXIBLE LIGHT HANDLE COVER: Brand: CARDINAL HEALTH

## (undated) DEVICE — DRAPE,TOP,102X53,STERILE: Brand: MEDLINE

## (undated) DEVICE — MEDI-VAC NON-CONDUCTIVE SUCTION TUBING: Brand: CARDINAL HEALTH

## (undated) DEVICE — BASIC SINGLE BASIN-LF: Brand: MEDLINE INDUSTRIES, INC.

## (undated) DEVICE — VISUALIZATION SYSTEM: Brand: CLEARIFY

## (undated) DEVICE — ELECTRO LUBE IS A SINGLE PATIENT USE DEVICE THAT IS INTENDED TO BE USED ON ELECTROSURGICAL ELECTRODES TO REDUCE STICKING.: Brand: KEY SURGICAL ELECTRO LUBE

## (undated) DEVICE — SOLUTION IRRIG 1000ML H2O STRL BLT

## (undated) DEVICE — TIP COVER ACCESSORY

## (undated) DEVICE — SOLUTION IRRIG 3000ML 0.9% SOD CHL FLX CONT 0797208] ICU MEDICAL INC]

## (undated) DEVICE — FCPS GRSP TENACULUM 8MM -- DA VINCI - REUSABLE 10X

## (undated) DEVICE — INSUFFLATION NEEDLE: Brand: SURGINEEDLE

## (undated) DEVICE — APPLICATOR FBR TIP L6IN COT TIP WOOD SHFT SWAB 2000 PER CA

## (undated) DEVICE — CATHETER URETH 16FR BLLN 5CC SIL ALLY W/ SIL HYDRGEL 2 W F

## (undated) DEVICE — DERMABOND SKIN ADH 0.7ML -- DERMABOND ADVANCED 12/BX

## (undated) DEVICE — DRAPE,UNDERBUTTOCKS,PCH,STERILE: Brand: MEDLINE

## (undated) DEVICE — WARMER SCP LAP

## (undated) DEVICE — MORCELLATOR ENDO 15MM OBT DISPOSABLEXCISE

## (undated) DEVICE — DRAPE ROBOTIC CAM ARM DISP ENDOWRIST DA VINCI SI

## (undated) DEVICE — SUTURE SZ 0 27IN 5/8 CIR UR-6  TAPER PT VIOLET ABSRB VICRYL J603H

## (undated) DEVICE — TIP IU L10CM DIA6.7MM GRN SIL FLX DISP RUMI II

## (undated) DEVICE — BAG DRNGE 4000ML CONT IRRIG ROUNDED TEARDROP SHP DISP

## (undated) DEVICE — SUTURE MCRYL SZ 4-0 L27IN ABSRB UD L19MM PS-2 1/2 CIR PRIM Y426H

## (undated) DEVICE — SPONGE LAP 18X18IN STRL -- 5/PK

## (undated) DEVICE — 2000CC GUARDIAN II: Brand: GUARDIAN

## (undated) DEVICE — KENDALL SCD EXPRESS SLEEVES, KNEE LENGTH, MEDIUM: Brand: KENDALL SCD

## (undated) DEVICE — DRAPE TWL SURG 16X26IN BLU ORB04] ALLCARE INC]

## (undated) DEVICE — CONTROL SYRINGE LUER-LOCK TIP: Brand: MONOJECT

## (undated) DEVICE — (D)PREP SKN CHLRAPRP APPL 26ML -- CONVERT TO ITEM 371833

## (undated) DEVICE — REM POLYHESIVE ADULT PATIENT RETURN ELECTRODE: Brand: VALLEYLAB

## (undated) DEVICE — LEGGINGS, PAIR, 31X48, STERILE: Brand: MEDLINE

## (undated) DEVICE — COVER,TABLE,44X76,STERILE: Brand: MEDLINE

## (undated) DEVICE — PERI-PAD,MODERATE: Brand: CURITY

## (undated) DEVICE — LOGICUT SCISSOR LENGTH 320MM: Brand: LOGI - LAPAROSCOPIC INSTRUMENT SYSTEM

## (undated) DEVICE — DRAPE ROBOTIC CAM HD DISP ENDOWRIST DA VINCI SI

## (undated) DEVICE — 2, DISPOSABLE SUCTION/IRRIGATOR WITHOUT DISPOSABLE TIP: Brand: STRYKEFLOW

## (undated) DEVICE — SUTURE ABSRB L12IN L12MM SZ 2-0 GS-22 VLT GLYCOLIDE VLOCM2115

## (undated) DEVICE — POUCH SPEC RETRV SHFT 15MM 13X23CM GRN POLYUR DBL RNG HNDL